# Patient Record
Sex: MALE | Race: WHITE | URBAN - METROPOLITAN AREA
[De-identification: names, ages, dates, MRNs, and addresses within clinical notes are randomized per-mention and may not be internally consistent; named-entity substitution may affect disease eponyms.]

---

## 2017-02-02 ENCOUNTER — OFFICE VISIT (OUTPATIENT)
Dept: URGENT CARE | Age: 42
End: 2017-02-02

## 2017-02-02 VITALS
HEART RATE: 84 BPM | BODY MASS INDEX: 31.83 KG/M2 | OXYGEN SATURATION: 97 % | HEIGHT: 68 IN | WEIGHT: 210 LBS | DIASTOLIC BLOOD PRESSURE: 84 MMHG | SYSTOLIC BLOOD PRESSURE: 136 MMHG | RESPIRATION RATE: 16 BRPM | TEMPERATURE: 98 F

## 2017-02-02 DIAGNOSIS — R10.32 LEFT GROIN PAIN: Primary | ICD-10-CM

## 2017-02-02 PROCEDURE — 99203 OFFICE O/P NEW LOW 30 MIN: CPT | Performed by: EMERGENCY MEDICINE

## 2017-02-02 RX ORDER — HYDROCODONE BITARTRATE AND ACETAMINOPHEN 5; 325 MG/1; MG/1
1 TABLET ORAL EVERY 6 HOURS PRN
Qty: 20 TABLET | Refills: 0 | Status: SHIPPED | OUTPATIENT
Start: 2017-02-02 | End: 2017-02-09

## 2017-02-03 ENCOUNTER — TELEPHONE (OUTPATIENT)
Dept: CARDIOLOGY CLINIC | Age: 42
End: 2017-02-03

## 2017-02-07 ENCOUNTER — OFFICE VISIT (OUTPATIENT)
Dept: CARDIOLOGY CLINIC | Age: 42
End: 2017-02-07

## 2017-02-07 ENCOUNTER — PROCEDURE VISIT (OUTPATIENT)
Dept: CARDIOLOGY CLINIC | Age: 42
End: 2017-02-07

## 2017-02-07 VITALS
HEART RATE: 75 BPM | HEIGHT: 68 IN | BODY MASS INDEX: 32.86 KG/M2 | WEIGHT: 216.8 LBS | DIASTOLIC BLOOD PRESSURE: 86 MMHG | SYSTOLIC BLOOD PRESSURE: 128 MMHG | OXYGEN SATURATION: 96 %

## 2017-02-07 DIAGNOSIS — M10.9 GOUT, UNSPECIFIED CAUSE, UNSPECIFIED CHRONICITY, UNSPECIFIED SITE: ICD-10-CM

## 2017-02-07 DIAGNOSIS — E78.5 HYPERLIPIDEMIA, UNSPECIFIED HYPERLIPIDEMIA TYPE: ICD-10-CM

## 2017-02-07 DIAGNOSIS — I42.9 CARDIOMYOPATHY (HCC): ICD-10-CM

## 2017-02-07 DIAGNOSIS — I10 ESSENTIAL HYPERTENSION: ICD-10-CM

## 2017-02-07 DIAGNOSIS — Z95.1 S/P CABG X 3: ICD-10-CM

## 2017-02-07 DIAGNOSIS — I15.0 RENOVASCULAR HYPERTENSION: Primary | ICD-10-CM

## 2017-02-07 DIAGNOSIS — Z95.810 ICD (IMPLANTABLE CARDIOVERTER-DEFIBRILLATOR) IN PLACE: Primary | ICD-10-CM

## 2017-02-07 DIAGNOSIS — I25.118 CORONARY ARTERY DISEASE OF NATIVE ARTERY OF NATIVE HEART WITH STABLE ANGINA PECTORIS (HCC): ICD-10-CM

## 2017-02-07 DIAGNOSIS — Z95.810 ICD (IMPLANTABLE CARDIOVERTER-DEFIBRILLATOR) IN PLACE: ICD-10-CM

## 2017-02-07 PROCEDURE — 93000 ELECTROCARDIOGRAM COMPLETE: CPT | Performed by: INTERNAL MEDICINE

## 2017-02-07 PROCEDURE — 93280 PM DEVICE PROGR EVAL DUAL: CPT | Performed by: INTERNAL MEDICINE

## 2017-02-07 PROCEDURE — 99204 OFFICE O/P NEW MOD 45 MIN: CPT | Performed by: INTERNAL MEDICINE

## 2017-02-07 RX ORDER — LEVOTHYROXINE SODIUM 0.12 MG/1
125 TABLET ORAL DAILY
Qty: 30 TABLET | Refills: 5 | Status: SHIPPED | OUTPATIENT
Start: 2017-02-07 | End: 2017-09-29 | Stop reason: SDUPTHER

## 2017-02-07 RX ORDER — CARVEDILOL 12.5 MG/1
TABLET ORAL
Qty: 90 TABLET | Refills: 5 | Status: SHIPPED | OUTPATIENT
Start: 2017-02-07 | End: 2017-03-31 | Stop reason: SDUPTHER

## 2017-02-07 RX ORDER — LISINOPRIL 5 MG/1
TABLET ORAL
Qty: 30 TABLET | Refills: 5 | Status: SHIPPED | OUTPATIENT
Start: 2017-02-07 | End: 2017-03-31 | Stop reason: SDUPTHER

## 2017-02-07 RX ORDER — NITROGLYCERIN 0.4 MG/1
0.4 TABLET SUBLINGUAL EVERY 5 MIN PRN
Qty: 25 TABLET | Refills: 5 | Status: SHIPPED | OUTPATIENT
Start: 2017-02-07 | End: 2017-09-08 | Stop reason: SDUPTHER

## 2017-02-28 ENCOUNTER — TELEPHONE (OUTPATIENT)
Dept: CARDIOLOGY CLINIC | Age: 42
End: 2017-02-28

## 2017-03-01 DIAGNOSIS — J45.30 MILD PERSISTENT ASTHMA WITHOUT COMPLICATION: ICD-10-CM

## 2017-03-10 ENCOUNTER — HOSPITAL ENCOUNTER (OUTPATIENT)
Dept: CARDIOLOGY | Facility: CLINIC | Age: 42
Discharge: HOME | End: 2017-03-10
Attending: INTERNAL MEDICINE | Admitting: INTERNAL MEDICINE

## 2017-03-15 ENCOUNTER — TELEPHONE (OUTPATIENT)
Dept: CARDIOLOGY CLINIC | Age: 42
End: 2017-03-15

## 2017-03-31 ENCOUNTER — OFFICE VISIT (OUTPATIENT)
Dept: CARDIOLOGY CLINIC | Age: 42
End: 2017-03-31

## 2017-03-31 VITALS
WEIGHT: 215 LBS | HEIGHT: 68 IN | DIASTOLIC BLOOD PRESSURE: 106 MMHG | BODY MASS INDEX: 32.58 KG/M2 | OXYGEN SATURATION: 98 % | HEART RATE: 76 BPM | SYSTOLIC BLOOD PRESSURE: 174 MMHG

## 2017-03-31 DIAGNOSIS — I15.0 RENOVASCULAR HYPERTENSION: ICD-10-CM

## 2017-03-31 DIAGNOSIS — F32.A ANXIETY AND DEPRESSION: ICD-10-CM

## 2017-03-31 DIAGNOSIS — Z95.1 S/P CABG X 3: ICD-10-CM

## 2017-03-31 DIAGNOSIS — I25.118 CORONARY ARTERY DISEASE OF NATIVE ARTERY OF NATIVE HEART WITH STABLE ANGINA PECTORIS (HCC): Primary | ICD-10-CM

## 2017-03-31 DIAGNOSIS — I10 ESSENTIAL HYPERTENSION: ICD-10-CM

## 2017-03-31 DIAGNOSIS — I42.9 CARDIOMYOPATHY (HCC): ICD-10-CM

## 2017-03-31 DIAGNOSIS — F41.9 ANXIETY AND DEPRESSION: ICD-10-CM

## 2017-03-31 DIAGNOSIS — E78.2 MIXED HYPERLIPIDEMIA: ICD-10-CM

## 2017-03-31 PROCEDURE — 99214 OFFICE O/P EST MOD 30 MIN: CPT | Performed by: INTERNAL MEDICINE

## 2017-03-31 RX ORDER — LISINOPRIL 10 MG/1
10 TABLET ORAL DAILY
Qty: 30 TABLET | Refills: 5 | Status: SHIPPED | OUTPATIENT
Start: 2017-03-31 | End: 2017-10-24 | Stop reason: SDUPTHER

## 2017-03-31 RX ORDER — CARVEDILOL 25 MG/1
25 TABLET ORAL 2 TIMES DAILY
Qty: 60 TABLET | Refills: 5 | Status: SHIPPED | OUTPATIENT
Start: 2017-03-31 | End: 2017-10-24 | Stop reason: SDUPTHER

## 2017-03-31 RX ORDER — BUSPIRONE HYDROCHLORIDE 10 MG/1
10 TABLET ORAL 3 TIMES DAILY
Qty: 90 TABLET | Refills: 5 | Status: SHIPPED | OUTPATIENT
Start: 2017-03-31 | End: 2018-06-12

## 2017-04-27 ENCOUNTER — TELEPHONE (OUTPATIENT)
Dept: CARDIOLOGY CLINIC | Age: 42
End: 2017-04-27

## 2017-04-27 DIAGNOSIS — J45.30 MILD PERSISTENT ASTHMA WITHOUT COMPLICATION: ICD-10-CM

## 2017-04-28 DIAGNOSIS — J45.30 MILD PERSISTENT ASTHMA WITHOUT COMPLICATION: ICD-10-CM

## 2017-04-28 RX ORDER — ALBUTEROL SULFATE 90 UG/1
2 AEROSOL, METERED RESPIRATORY (INHALATION) EVERY 6 HOURS PRN
Qty: 18 G | Refills: 5 | Status: SHIPPED | OUTPATIENT
Start: 2017-04-28 | End: 2017-05-29

## 2017-05-09 ENCOUNTER — NURSE ONLY (OUTPATIENT)
Dept: CARDIOLOGY CLINIC | Age: 42
End: 2017-05-09

## 2017-05-09 DIAGNOSIS — I42.9 CARDIOMYOPATHY (HCC): ICD-10-CM

## 2017-05-09 DIAGNOSIS — Z95.810 ICD (IMPLANTABLE CARDIOVERTER-DEFIBRILLATOR) IN PLACE: Primary | ICD-10-CM

## 2017-05-09 PROCEDURE — 93295 DEV INTERROG REMOTE 1/2/MLT: CPT | Performed by: INTERNAL MEDICINE

## 2017-05-09 PROCEDURE — 93296 REM INTERROG EVL PM/IDS: CPT | Performed by: INTERNAL MEDICINE

## 2017-05-30 ENCOUNTER — OFFICE VISIT (OUTPATIENT)
Dept: ORTHOPEDIC SURGERY | Age: 42
End: 2017-05-30

## 2017-05-30 VITALS — HEIGHT: 68 IN | BODY MASS INDEX: 32.14 KG/M2 | WEIGHT: 212.08 LBS

## 2017-05-30 DIAGNOSIS — M25.852 HIP IMPINGEMENT SYNDROME, LEFT: ICD-10-CM

## 2017-05-30 DIAGNOSIS — M70.62 GREATER TROCHANTERIC BURSITIS, LEFT: Primary | ICD-10-CM

## 2017-05-30 PROBLEM — M70.60 GREATER TROCHANTERIC BURSITIS: Status: ACTIVE | Noted: 2017-05-30

## 2017-05-30 PROCEDURE — 20611 DRAIN/INJ JOINT/BURSA W/US: CPT | Performed by: PHYSICIAN ASSISTANT

## 2017-05-30 PROCEDURE — 99203 OFFICE O/P NEW LOW 30 MIN: CPT | Performed by: PHYSICIAN ASSISTANT

## 2017-08-29 ENCOUNTER — NURSE ONLY (OUTPATIENT)
Dept: CARDIOLOGY CLINIC | Age: 42
End: 2017-08-29

## 2017-08-29 DIAGNOSIS — I42.9 CARDIOMYOPATHY, UNSPECIFIED TYPE (HCC): ICD-10-CM

## 2017-08-29 DIAGNOSIS — Z95.810 ICD (IMPLANTABLE CARDIOVERTER-DEFIBRILLATOR) IN PLACE: Primary | ICD-10-CM

## 2017-08-29 PROCEDURE — 93296 REM INTERROG EVL PM/IDS: CPT | Performed by: INTERNAL MEDICINE

## 2017-08-29 PROCEDURE — 93295 DEV INTERROG REMOTE 1/2/MLT: CPT | Performed by: INTERNAL MEDICINE

## 2017-09-08 RX ORDER — NITROGLYCERIN 0.4 MG/1
0.4 TABLET SUBLINGUAL EVERY 5 MIN PRN
Qty: 25 TABLET | Refills: 3 | Status: SHIPPED | OUTPATIENT
Start: 2017-09-08 | End: 2017-10-24 | Stop reason: SDUPTHER

## 2017-09-29 RX ORDER — LEVOTHYROXINE SODIUM 0.12 MG/1
TABLET ORAL
Qty: 30 TABLET | Refills: 0 | Status: SHIPPED | OUTPATIENT
Start: 2017-09-29 | End: 2017-10-24 | Stop reason: SDUPTHER

## 2017-10-24 ENCOUNTER — OFFICE VISIT (OUTPATIENT)
Dept: CARDIOLOGY CLINIC | Age: 42
End: 2017-10-24

## 2017-10-24 VITALS
HEART RATE: 82 BPM | SYSTOLIC BLOOD PRESSURE: 136 MMHG | OXYGEN SATURATION: 96 % | WEIGHT: 224 LBS | DIASTOLIC BLOOD PRESSURE: 86 MMHG | HEIGHT: 68 IN | BODY MASS INDEX: 33.95 KG/M2

## 2017-10-24 DIAGNOSIS — I25.118 CORONARY ARTERY DISEASE OF NATIVE ARTERY OF NATIVE HEART WITH STABLE ANGINA PECTORIS (HCC): ICD-10-CM

## 2017-10-24 DIAGNOSIS — Z95.810 ICD (IMPLANTABLE CARDIOVERTER-DEFIBRILLATOR) IN PLACE: ICD-10-CM

## 2017-10-24 DIAGNOSIS — E78.2 MIXED HYPERLIPIDEMIA: ICD-10-CM

## 2017-10-24 DIAGNOSIS — I10 ESSENTIAL HYPERTENSION: Primary | ICD-10-CM

## 2017-10-24 DIAGNOSIS — I25.5 ISCHEMIC CARDIOMYOPATHY: ICD-10-CM

## 2017-10-24 PROCEDURE — 99214 OFFICE O/P EST MOD 30 MIN: CPT | Performed by: INTERNAL MEDICINE

## 2017-10-24 RX ORDER — ISOSORBIDE DINITRATE 10 MG/1
10 TABLET ORAL 2 TIMES DAILY
Qty: 60 TABLET | Refills: 6 | Status: SHIPPED | OUTPATIENT
Start: 2017-10-24 | End: 2018-06-12 | Stop reason: SDUPTHER

## 2017-10-24 RX ORDER — LEVOTHYROXINE SODIUM 0.12 MG/1
125 TABLET ORAL DAILY
Qty: 30 TABLET | Refills: 11 | Status: SHIPPED | OUTPATIENT
Start: 2017-10-24 | End: 2018-11-19 | Stop reason: SDUPTHER

## 2017-10-24 RX ORDER — NITROGLYCERIN 0.4 MG/1
0.4 TABLET SUBLINGUAL EVERY 5 MIN PRN
Qty: 25 TABLET | Refills: 3 | Status: SHIPPED | OUTPATIENT
Start: 2017-10-24 | End: 2018-04-27 | Stop reason: SDUPTHER

## 2017-10-24 RX ORDER — LISINOPRIL 10 MG/1
10 TABLET ORAL DAILY
Qty: 30 TABLET | Refills: 11 | Status: SHIPPED | OUTPATIENT
Start: 2017-10-24 | End: 2018-06-12 | Stop reason: SDUPTHER

## 2017-10-24 RX ORDER — CARVEDILOL 25 MG/1
25 TABLET ORAL 2 TIMES DAILY
Qty: 60 TABLET | Refills: 11 | Status: SHIPPED | OUTPATIENT
Start: 2017-10-24 | End: 2018-06-12 | Stop reason: SDUPTHER

## 2017-10-24 NOTE — PROGRESS NOTES
 Sexual dysfunction      Past Surgical History:   Procedure Laterality Date    CARDIAC DEFIBRILLATOR PLACEMENT         Objective:   /86   Pulse 82   Ht 5' 8\" (1.727 m)   Wt 224 lb (101.6 kg)   SpO2 96%   BMI 34.06 kg/m²     Wt Readings from Last 3 Encounters:   10/24/17 224 lb (101.6 kg)   05/30/17 212 lb 1.3 oz (96.2 kg)   05/29/17 212 lb (96.2 kg)       Physical Exam:  General: No Respiratory distress, appears well developed and well nourished. Eyes:  Sclera nonicteric  Nose/Sinuses:  negative findings: nose shows no deformity, asymmetry, or inflammation, nasal mucosa normal, septum midline with no perforation or bleeding  Back:  no pain to palpation  Joint:  no active joint inflammation  Musculoskeletal:  negative  Skin:  Warm and dry  Neck:  Negative for JVD and Carotid Bruits. Chest:  Clear to auscultation, respiration easy  Cardiovascular:  RRR, S1S2 normal, no murmur, no rub or thrill. Abdomen:  Soft normal liver and spleen  Extremities:   No edema, clubbing, cyanosis,  Pulses: Femoral and pedal pulses are normal.  Neuro: intact    Medications:   Outpatient Encounter Prescriptions as of 10/24/2017   Medication Sig Dispense Refill    levothyroxine (SYNTHROID) 125 MCG tablet TAKE ONE TABLET BY MOUTH DAILY 30 tablet 0    nitroGLYCERIN (NITROSTAT) 0.4 MG SL tablet Place 1 tablet under the tongue every 5 minutes as needed for Chest pain 25 tablet 3    albuterol sulfate HFA (PROVENTIL HFA) 108 (90 BASE) MCG/ACT inhaler Inhale 2 puffs into the lungs every 6 hours as needed for Wheezing or Shortness of Breath With spacer (and mask if indicated). Thanks.  1 Inhaler 0    aspirin 81 MG tablet Take 81 mg by mouth daily      lisinopril (PRINIVIL;ZESTRIL) 10 MG tablet Take 1 tablet by mouth daily One daily 30 tablet 5    carvedilol (COREG) 25 MG tablet Take 1 tablet by mouth 2 times daily 60 tablet 5    busPIRone (BUSPAR) 10 MG tablet Take 1 tablet by mouth 3 times daily 90 tablet 5    ALPRAZolam Kandis Duty) 0.5 MG tablet Take 1 tablet by mouth nightly as needed for Sleep or Anxiety 30 tablet 0    [DISCONTINUED] budesonide-formoterol (SYMBICORT) 160-4.5 MCG/ACT AERO Inhale 2 puffs into the lungs 2 times daily. 1 Inhaler 5    colchicine 0.6 MG tablet Take 1 tablet by mouth daily. 30 tablet 5     No facility-administered encounter medications on file as of 10/24/2017. Lab Data:  CBC: No results for input(s): WBC, HGB, HCT, MCV, PLT in the last 72 hours. BMP: No results for input(s): NA, K, CL, CO2, PHOS, BUN, CREATININE in the last 72 hours. Invalid input(s): CA  LIVER PROFILE: No results for input(s): AST, ALT, LIPASE, BILIDIR, BILITOT, ALKPHOS in the last 72 hours. Invalid input(s): AMYLASE,  ALB  LIPID: No results found for: CHOL  No results found for: TRIG  No results found for: HDL  No results found for: LDLCHOLESTEROL, LDLCALC  No results found for: LABVLDL, VLDL  No results found for: CHOLHDLRATIO  PT/INR: No results for input(s): PROTIME, INR in the last 72 hours. A1C: No results found for: LABA1C  BNP:  No results for input(s): BNP in the last 72 hours. IMAGING:   Most recent device check 8/29/17  Carelink transmission shows normal sensing and pacing function. Echo 10/15/15 [facility]  Summary   Limited echo for LV function with limited doppler/color. No echo for   comparison. The left ventricular systolic function is moderately reduced with an   ejection fraction of 35-40%. There is severe hypokinesis of the apical   septal, basal inferoseptal, basal inferior and the entire inferolateral   walls. The apex is akinetic. LV size is upper limits of normal. Grade 1   diastolic dysfunction with normal filling pressures. Assessment:  Lilian Street was seen today for 3 month follow-up, coronary artery disease, hyperlipidemia, hypertension, results, discuss labs, chest pain, shortness of breath and fatigue.     Diagnoses and all orders for this visit:    Essential hypertension    Mixed

## 2017-10-24 NOTE — COMMUNICATION BODY
ALPRAZolam (XANAX) 0.5 MG tablet Take 1 tablet by mouth nightly as needed for Sleep or Anxiety 30 tablet 0    [DISCONTINUED] budesonide-formoterol (SYMBICORT) 160-4.5 MCG/ACT AERO Inhale 2 puffs into the lungs 2 times daily. 1 Inhaler 5    colchicine 0.6 MG tablet Take 1 tablet by mouth daily. 30 tablet 5     No facility-administered encounter medications on file as of 10/24/2017. Lab Data:  CBC: No results for input(s): WBC, HGB, HCT, MCV, PLT in the last 72 hours. BMP: No results for input(s): NA, K, CL, CO2, PHOS, BUN, CREATININE in the last 72 hours. Invalid input(s): CA  LIVER PROFILE: No results for input(s): AST, ALT, LIPASE, BILIDIR, BILITOT, ALKPHOS in the last 72 hours. Invalid input(s): AMYLASE,  ALB  LIPID: No results found for: CHOL  No results found for: TRIG  No results found for: HDL  No results found for: LDLCHOLESTEROL, LDLCALC  No results found for: LABVLDL, VLDL  No results found for: CHOLHDLRATIO  PT/INR: No results for input(s): PROTIME, INR in the last 72 hours. A1C: No results found for: LABA1C  BNP:  No results for input(s): BNP in the last 72 hours. IMAGING:   Most recent device check 8/29/17  Carelink transmission shows normal sensing and pacing function. Echo 10/15/15 [facility]  Summary   Limited echo for LV function with limited doppler/color. No echo for   comparison. The left ventricular systolic function is moderately reduced with an   ejection fraction of 35-40%. There is severe hypokinesis of the apical   septal, basal inferoseptal, basal inferior and the entire inferolateral   walls. The apex is akinetic. LV size is upper limits of normal. Grade 1   diastolic dysfunction with normal filling pressures. Assessment:  Tala Isaac was seen today for 3 month follow-up, coronary artery disease, hyperlipidemia, hypertension, results, discuss labs, chest pain, shortness of breath and fatigue.     Diagnoses and all orders for this visit:    Essential hypertension    Mixed hyperlipidemia    Coronary artery disease of native artery of native heart with stable angina pectoris ([facility] [address]    ICD (implantable cardioverter-defibrillator) 11/11/16    Ischemic cardiomyopathy        Plan:  1.  OK to take Imdur but will change to Isosorbide 10 mg 1 daily may take up to twice a day OK to take SL nitro if needed as well  2. Unable to tolerate statin  3. Remain as active as possible. 4. Check blood pressure daily. Goal <140/90.   5 Follow up with me in 6 months. QUALITY MEASURES  1. Tobacco Cessation Counseling: NA  2. Retake of BP if >140/90:   NA  3. Documentation to PCP/referring for new patient:  Sent to PCP at close of office visit  4. CAD patient on anti-platelet: Yes  5. CAD patient on STATIN therapy:  No unable to tolerate statin  6.  Patient with CHF and aFib on anticoagulation:  NA     [address] [place], MD 10/24/2017 3:49 PM

## 2017-12-05 ENCOUNTER — NURSE ONLY (OUTPATIENT)
Dept: CARDIOLOGY CLINIC | Age: 42
End: 2017-12-05

## 2017-12-05 DIAGNOSIS — I25.5 ISCHEMIC CARDIOMYOPATHY: ICD-10-CM

## 2017-12-05 DIAGNOSIS — Z95.810 ICD (IMPLANTABLE CARDIOVERTER-DEFIBRILLATOR) IN PLACE: Primary | ICD-10-CM

## 2017-12-05 PROCEDURE — 93295 DEV INTERROG REMOTE 1/2/MLT: CPT | Performed by: INTERNAL MEDICINE

## 2017-12-05 PROCEDURE — 93296 REM INTERROG EVL PM/IDS: CPT | Performed by: INTERNAL MEDICINE

## 2017-12-05 NOTE — LETTER
[address] [facility] [phone]  [facility]    Pacemaker/Defibrillator Clinic          12/11/17        [address] [place] [address]        Dear Josiah Mccrackne    This letter is to inform you that we received the transmission from your monitor at home that checks your pacemaker and/or defibrillator, or implanted heart monitor. Everything is within normal limits. The next date your monitor will automatically transmit will be 3/20/18 Please do not send additional routine transmissions unless specifically requested. Your device and monitor are wireless and most transmit cellularly, but please periodically check your monitor is still plugged in to the electrical outlet. If you still use the telephone land line to send please ensure the connection to the phone lamar is secure. This will help to ensure successful automatic transmissions in the future. Also, the monitor needs to be close to you while sleeping at night. Please be aware that the remote device transmission sites are periodically monitored only during regular business hours during which simultaneous in-office device clinics are being run. If your transmission requires attention, we will contact you as soon as possible. Thank you.             Diane 81

## 2018-03-20 ENCOUNTER — NURSE ONLY (OUTPATIENT)
Dept: CARDIOLOGY CLINIC | Age: 43
End: 2018-03-20

## 2018-03-20 DIAGNOSIS — Z95.810 ICD (IMPLANTABLE CARDIOVERTER-DEFIBRILLATOR) IN PLACE: Primary | ICD-10-CM

## 2018-03-20 DIAGNOSIS — I25.5 ISCHEMIC CARDIOMYOPATHY: ICD-10-CM

## 2018-03-20 PROCEDURE — 93295 DEV INTERROG REMOTE 1/2/MLT: CPT | Performed by: INTERNAL MEDICINE

## 2018-03-20 PROCEDURE — 93296 REM INTERROG EVL PM/IDS: CPT | Performed by: INTERNAL MEDICINE

## 2018-04-27 RX ORDER — NITROGLYCERIN 0.4 MG/1
0.4 TABLET SUBLINGUAL EVERY 5 MIN PRN
Qty: 25 TABLET | Refills: 3 | Status: SHIPPED | OUTPATIENT
Start: 2018-04-27 | End: 2018-10-01 | Stop reason: SDUPTHER

## 2018-06-05 ENCOUNTER — PROCEDURE VISIT (OUTPATIENT)
Dept: CARDIOLOGY CLINIC | Age: 43
End: 2018-06-05

## 2018-06-05 DIAGNOSIS — I25.5 ISCHEMIC CARDIOMYOPATHY: ICD-10-CM

## 2018-06-05 DIAGNOSIS — Z95.810 ICD (IMPLANTABLE CARDIOVERTER-DEFIBRILLATOR) IN PLACE: ICD-10-CM

## 2018-06-12 ENCOUNTER — OFFICE VISIT (OUTPATIENT)
Dept: CARDIOLOGY CLINIC | Age: 43
End: 2018-06-12

## 2018-06-12 VITALS
DIASTOLIC BLOOD PRESSURE: 82 MMHG | HEART RATE: 86 BPM | HEIGHT: 68 IN | BODY MASS INDEX: 33.65 KG/M2 | SYSTOLIC BLOOD PRESSURE: 134 MMHG | WEIGHT: 222 LBS | OXYGEN SATURATION: 97 %

## 2018-06-12 DIAGNOSIS — Z95.810 ICD (IMPLANTABLE CARDIOVERTER-DEFIBRILLATOR) IN PLACE: ICD-10-CM

## 2018-06-12 DIAGNOSIS — I10 ESSENTIAL HYPERTENSION: ICD-10-CM

## 2018-06-12 DIAGNOSIS — I25.5 ISCHEMIC CARDIOMYOPATHY: ICD-10-CM

## 2018-06-12 DIAGNOSIS — I25.118 CORONARY ARTERY DISEASE OF NATIVE ARTERY OF NATIVE HEART WITH STABLE ANGINA PECTORIS (HCC): Primary | ICD-10-CM

## 2018-06-12 PROCEDURE — 99214 OFFICE O/P EST MOD 30 MIN: CPT | Performed by: INTERNAL MEDICINE

## 2018-06-12 PROCEDURE — 93283 PRGRMG EVAL IMPLANTABLE DFB: CPT | Performed by: INTERNAL MEDICINE

## 2018-06-12 RX ORDER — ISOSORBIDE DINITRATE 10 MG/1
10 TABLET ORAL 2 TIMES DAILY
Qty: 60 TABLET | Refills: 11 | Status: SHIPPED | OUTPATIENT
Start: 2018-06-12 | End: 2019-06-24 | Stop reason: SDUPTHER

## 2018-06-12 RX ORDER — LISINOPRIL 10 MG/1
10 TABLET ORAL DAILY
Qty: 30 TABLET | Refills: 11 | Status: SHIPPED | OUTPATIENT
Start: 2018-06-12 | End: 2019-06-24 | Stop reason: SDUPTHER

## 2018-06-12 RX ORDER — CARVEDILOL 25 MG/1
25 TABLET ORAL 2 TIMES DAILY
Qty: 60 TABLET | Refills: 11 | Status: SHIPPED | OUTPATIENT
Start: 2018-06-12 | End: 2019-06-24 | Stop reason: SDUPTHER

## 2018-06-12 RX ORDER — ALBUTEROL SULFATE 90 UG/1
2 AEROSOL, METERED RESPIRATORY (INHALATION) EVERY 6 HOURS PRN
Qty: 1 INHALER | Refills: 3 | Status: SHIPPED | OUTPATIENT
Start: 2018-06-12 | End: 2020-01-21

## 2018-09-11 ENCOUNTER — NURSE ONLY (OUTPATIENT)
Dept: CARDIOLOGY CLINIC | Age: 43
End: 2018-09-11

## 2018-09-11 DIAGNOSIS — Z95.810 ICD (IMPLANTABLE CARDIOVERTER-DEFIBRILLATOR) IN PLACE: Primary | ICD-10-CM

## 2018-09-11 DIAGNOSIS — I25.5 ISCHEMIC CARDIOMYOPATHY: ICD-10-CM

## 2018-09-11 PROCEDURE — 93295 DEV INTERROG REMOTE 1/2/MLT: CPT | Performed by: INTERNAL MEDICINE

## 2018-09-11 PROCEDURE — 93296 REM INTERROG EVL PM/IDS: CPT | Performed by: INTERNAL MEDICINE

## 2018-09-13 NOTE — PROGRESS NOTES
Carelink transmission shows normal functioning device. Thoracic impedance stable. See PACEART report under Cardiology tab.

## 2018-10-01 RX ORDER — NITROGLYCERIN 0.4 MG/1
0.4 TABLET SUBLINGUAL EVERY 5 MIN PRN
Qty: 25 TABLET | Refills: 3 | Status: SHIPPED | OUTPATIENT
Start: 2018-10-01 | End: 2019-03-08 | Stop reason: SDUPTHER

## 2018-11-19 RX ORDER — LEVOTHYROXINE SODIUM 0.12 MG/1
125 TABLET ORAL DAILY
Qty: 30 TABLET | Refills: 5 | Status: SHIPPED | OUTPATIENT
Start: 2018-11-19 | End: 2019-10-24 | Stop reason: SDUPTHER

## 2018-12-11 ENCOUNTER — NURSE ONLY (OUTPATIENT)
Dept: CARDIOLOGY CLINIC | Age: 43
End: 2018-12-11
Payer: COMMERCIAL

## 2018-12-11 DIAGNOSIS — Z95.810 ICD (IMPLANTABLE CARDIOVERTER-DEFIBRILLATOR) IN PLACE: ICD-10-CM

## 2018-12-11 DIAGNOSIS — I25.5 ISCHEMIC CARDIOMYOPATHY: ICD-10-CM

## 2018-12-11 PROCEDURE — 93295 DEV INTERROG REMOTE 1/2/MLT: CPT | Performed by: INTERNAL MEDICINE

## 2018-12-11 PROCEDURE — 93296 REM INTERROG EVL PM/IDS: CPT | Performed by: INTERNAL MEDICINE

## 2018-12-11 NOTE — LETTER
[address] [facility] [phone]  [place]- [phone]  gen [phone]    Pacemaker/Defibrillator Clinic          12/12/18        [address] [place] [id number]        Dear Sinai Brumfield    This letter is to inform you that we received the transmission from your monitor at home that checks your pacemaker and/or defibrillator, or implanted heart monitor. The next date your monitor will automatically transmit will be 03/12/19. Please do not send additional routine transmissions unless specifically requested. Your device and monitor are wireless and most transmit cellularly, but please periodically check your monitor is still plugged in to the electrical outlet. If you still use the telephone land line to send please ensure the connection to the phone lamar is secure. This will help to ensure successful automatic transmissions in the future. Also, the monitor needs to be close to you while sleeping at night. Please be aware that the remote device transmission sites are periodically monitored only during regular business hours during which simultaneous in-office device clinics are being run. If your transmission requires attention, we will contact you as soon as possible. Thank you.             Diane 81

## 2019-03-08 RX ORDER — NITROGLYCERIN 0.4 MG/1
0.4 TABLET SUBLINGUAL EVERY 5 MIN PRN
Qty: 25 TABLET | Refills: 0 | Status: SHIPPED | OUTPATIENT
Start: 2019-03-08 | End: 2019-06-04 | Stop reason: SDUPTHER

## 2019-03-12 ENCOUNTER — NURSE ONLY (OUTPATIENT)
Dept: CARDIOLOGY CLINIC | Age: 44
End: 2019-03-12
Payer: COMMERCIAL

## 2019-03-12 DIAGNOSIS — Z95.810 ICD (IMPLANTABLE CARDIOVERTER-DEFIBRILLATOR) IN PLACE: ICD-10-CM

## 2019-03-12 DIAGNOSIS — I25.5 ISCHEMIC CARDIOMYOPATHY: ICD-10-CM

## 2019-03-12 PROCEDURE — 93296 REM INTERROG EVL PM/IDS: CPT | Performed by: INTERNAL MEDICINE

## 2019-03-12 PROCEDURE — 93295 DEV INTERROG REMOTE 1/2/MLT: CPT | Performed by: INTERNAL MEDICINE

## 2019-06-04 NOTE — TELEPHONE ENCOUNTER
Pt called and said that the pharmacy will not refill his Nitro w/out an office visit. Pt is scheduled for 7/9 w/MELANY @ [facility]. Pt needed a Tuesday afternoon appt. Pt is requesting if we can refill his Nitro before the appt? Please advise, thank you.

## 2019-06-06 RX ORDER — NITROGLYCERIN 0.4 MG/1
TABLET SUBLINGUAL
Qty: 10 TABLET | Refills: 0 | Status: SHIPPED | OUTPATIENT
Start: 2019-06-06 | End: 2019-07-09 | Stop reason: SDUPTHER

## 2019-06-11 ENCOUNTER — NURSE ONLY (OUTPATIENT)
Dept: CARDIOLOGY CLINIC | Age: 44
End: 2019-06-11
Payer: COMMERCIAL

## 2019-06-11 DIAGNOSIS — Z95.810 ICD (IMPLANTABLE CARDIOVERTER-DEFIBRILLATOR) IN PLACE: ICD-10-CM

## 2019-06-11 DIAGNOSIS — I25.5 ISCHEMIC CARDIOMYOPATHY: ICD-10-CM

## 2019-06-11 PROCEDURE — 93295 DEV INTERROG REMOTE 1/2/MLT: CPT | Performed by: INTERNAL MEDICINE

## 2019-06-11 PROCEDURE — 93296 REM INTERROG EVL PM/IDS: CPT | Performed by: INTERNAL MEDICINE

## 2019-06-11 NOTE — LETTER
[address] [facility] [phone]  [facility]    Pacemaker/Defibrillator Clinic          06/11/19        [address] [place] [id number]        Dear [place]    This letter is to inform you that we received the transmission from your monitor at home that checks your pacemaker and/or defibrillator, or implanted heart monitor. The next date your monitor will automatically transmit will be 9/10/19. Your device and monitor are wireless and most transmit cellularly, but please periodically check your monitor is still plugged in to the electrical outlet. If you still use the telephone land line to send please ensure the connection to the phone lamar is secure. This will help to ensure successful automatic transmissions in the future. Also, the monitor needs to be close to you while sleeping at night. Please be aware that the remote device transmission sites are periodically monitored only during regular business hours during which simultaneous in-office device clinics are being run. If your transmission requires attention, we will contact you as soon as possible. Thank you.             [address]

## 2019-06-11 NOTE — PROGRESS NOTES
Carelink transmission shows normal sensing and pacing function. See interrogation for more details. Thoracic impedance trend stable. No new arrhythmias. AP 9.6%   <0.1%  Follow up in 3 months via carelink. See PACEART report under Cardiology tab.

## 2019-06-24 DIAGNOSIS — I10 ESSENTIAL HYPERTENSION: ICD-10-CM

## 2019-06-24 RX ORDER — ISOSORBIDE DINITRATE 10 MG/1
10 TABLET ORAL 2 TIMES DAILY
Qty: 60 TABLET | Refills: 0 | Status: SHIPPED | OUTPATIENT
Start: 2019-06-24 | End: 2019-07-09 | Stop reason: SDUPTHER

## 2019-06-24 RX ORDER — CARVEDILOL 25 MG/1
25 TABLET ORAL 2 TIMES DAILY
Qty: 60 TABLET | Refills: 0 | Status: SHIPPED | OUTPATIENT
Start: 2019-06-24 | End: 2019-07-09 | Stop reason: SDUPTHER

## 2019-06-24 RX ORDER — LISINOPRIL 10 MG/1
10 TABLET ORAL DAILY
Qty: 30 TABLET | Refills: 0 | Status: SHIPPED | OUTPATIENT
Start: 2019-06-24 | End: 2019-07-09 | Stop reason: SDUPTHER

## 2019-07-08 PROBLEM — I25.10 CORONARY ARTERY DISEASE INVOLVING NATIVE CORONARY ARTERY OF NATIVE HEART WITHOUT ANGINA PECTORIS: Status: ACTIVE | Noted: 2017-02-07

## 2019-07-08 NOTE — PROGRESS NOTES
[facility] Office Note  7/9/2019     Subjective:  Mr. Shaq Pereyra presents for follow up for CAD with CABG and dual chamber ICD management. HPI:   Today he reports he has been doing good, except for last month when he was under a lot of stress with house being repossessed and world crumbling down around him. He had daily episodes of chest pain with associated SOB. Now things have turned around and he reports  chest pains only few days per week, resolves with SL nitro, and has to lay down. He feels really good otherwise. Denies chest pain, shortness of breath, edema, dizziness, palpitations and syncope. PMH:  Cathie Marley is a 40 y.o. male who presents for evaluation of CAD with CABG in 2007. He had cardiac arrest on 10/13/16 due to arrhythmia while at work. He underwent repeat cardiac cath on 10/13/16 which showed patent LIMA to diagonal,two occluded SVG, extensive collateral formation from LAD system to RCA. Moderate global hypokinesis with lateral wall akinesis. His EF was 30-35%. He underwent dual chamber ICD placement at [facility] on 11/11/16. Review of Systems:  12 point ROS negative in all areas as listed below except as in [place]  Constitutional, EENT, Cardiovascular, pulmonary, GI, , Musculoskeletal, skin, neurological, hematological, endocrine, Psychiatric    Reviewed past medical history, social, and family history. Smoker:nonsmoker  Alcohol:no alcohol  Mother No heart disease  Father heart attacks. CVA  Past Medical History:   Diagnosis Date    Anxious mood     Fatigue     Gouty arthritis     Hyperlipidemia     Hypertension     Hyperthyroidism     Hypothyroidism     Other (abnormal) findings on radiological examination of breast     difibulator placment end of Oct 2016, Dr. Yung Lees Sexual dysfunction      Past Surgical History:   Procedure Laterality Date    CARDIAC DEFIBRILLATOR PLACEMENT         Objective:   /86   Pulse 76   Ht 5' 8\" (1.727 m)   Wt 211 lb (95.7 with an   ejection fraction of 35-40%. There is severe hypokinesis of the apical   septal, basal inferoseptal, basal inferior and the entire inferolateral   walls. The apex is akinetic. LV size is upper limits of normal. Grade 1   diastolic dysfunction with normal filling pressures.     CATH 10/13/16  Cath EF Estimated 30 %   Result Narrative   Patent LIMA to Diagonal.   Two occluded SVG. Extensive collateral formation from LAD system to RCA. Moderate global hypokinesis with lateral wall akinesis. Echo 10/15/15 [facility]  Summary   Limited echo for LV function with limited doppler/color. No echo for   comparison. The left ventricular systolic function is moderately reduced with an   ejection fraction of 35-40%. There is severe hypokinesis of the apical   septal, basal inferoseptal, basal inferior and the entire inferolateral   walls. The apex is akinetic. LV size is upper limits of normal. Grade 1   diastolic dysfunction with normal filling pressures. Assessment:  Jason was seen today for 1 year follow up, coronary artery disease, hyperlipidemia, hypertension, discuss labs, chest pain, shortness of breath and edema. Diagnoses and all orders for this visit:    Coronary artery disease involving native coronary artery of native heart with unstable angina pectoris ([address]  -     Stress test ([facility]); Future    Ischemic cardiomyopathy  -     Stress test ([facility]); Future    ICD (implantable cardioverter-defibrillator) 11/11/16    Essential hypertension  -     carvedilol (COREG) 25 MG tablet; Take 1 tablet by mouth 2 times daily    Mixed hyperlipidemia    Other orders  -     isosorbide dinitrate (ISORDIL) 10 MG tablet; Take 1 tablet by mouth 2 times daily  -     lisinopril (PRINIVIL;ZESTRIL) 10 MG tablet; Take 1 tablet by mouth daily One daily  -     nitroGLYCERIN (NITROSTAT) 0.4 MG SL tablet; Place 1 tablet under the tongue every 5 minutes as needed for Chest pain up to max of 3 total doses.  If

## 2019-07-09 ENCOUNTER — OFFICE VISIT (OUTPATIENT)
Dept: CARDIOLOGY CLINIC | Age: 44
End: 2019-07-09
Payer: COMMERCIAL

## 2019-07-09 VITALS
WEIGHT: 211 LBS | BODY MASS INDEX: 31.98 KG/M2 | DIASTOLIC BLOOD PRESSURE: 86 MMHG | SYSTOLIC BLOOD PRESSURE: 124 MMHG | HEIGHT: 68 IN | OXYGEN SATURATION: 99 % | HEART RATE: 76 BPM

## 2019-07-09 DIAGNOSIS — I10 ESSENTIAL HYPERTENSION: ICD-10-CM

## 2019-07-09 DIAGNOSIS — I25.5 ISCHEMIC CARDIOMYOPATHY: ICD-10-CM

## 2019-07-09 DIAGNOSIS — I25.110 CORONARY ARTERY DISEASE INVOLVING NATIVE CORONARY ARTERY OF NATIVE HEART WITH UNSTABLE ANGINA PECTORIS (HCC): Primary | ICD-10-CM

## 2019-07-09 DIAGNOSIS — Z95.810 ICD (IMPLANTABLE CARDIOVERTER-DEFIBRILLATOR) IN PLACE: ICD-10-CM

## 2019-07-09 DIAGNOSIS — E78.2 MIXED HYPERLIPIDEMIA: ICD-10-CM

## 2019-07-09 PROCEDURE — 99214 OFFICE O/P EST MOD 30 MIN: CPT | Performed by: INTERNAL MEDICINE

## 2019-07-09 RX ORDER — LISINOPRIL 10 MG/1
10 TABLET ORAL DAILY
Qty: 30 TABLET | Refills: 11 | Status: SHIPPED | OUTPATIENT
Start: 2019-07-09 | End: 2020-07-30

## 2019-07-09 RX ORDER — CARVEDILOL 25 MG/1
25 TABLET ORAL 2 TIMES DAILY
Qty: 60 TABLET | Refills: 11 | Status: SHIPPED | OUTPATIENT
Start: 2019-07-09 | End: 2020-07-30

## 2019-07-09 RX ORDER — NITROGLYCERIN 0.4 MG/1
0.4 TABLET SUBLINGUAL EVERY 5 MIN PRN
Qty: 25 TABLET | Refills: 5 | Status: SHIPPED | OUTPATIENT
Start: 2019-07-09 | End: 2020-01-21 | Stop reason: SDUPTHER

## 2019-07-09 RX ORDER — ISOSORBIDE DINITRATE 10 MG/1
10 TABLET ORAL 2 TIMES DAILY
Qty: 60 TABLET | Refills: 11 | Status: SHIPPED | OUTPATIENT
Start: 2019-07-09 | End: 2020-01-21 | Stop reason: ALTCHOICE

## 2019-07-09 NOTE — PATIENT INSTRUCTIONS
kg)   SpO2 99%   BMI 32.08 kg/m²      Wt Readings from Last 3 Encounters:   07/09/19 211 lb (95.7 kg)   06/12/18 222 lb (100.7 kg)   10/24/17 224 lb (101.6 kg)       Physical Exam:  General: No Respiratory distress, appears well developed and well nourished. Eyes:  Sclera nonicteric  Nose/Sinuses:  negative findings: nose shows no deformity, asymmetry, or inflammation, nasal mucosa normal, septum midline with no perforation or bleeding  Back:  no pain to palpation  Joint:  no active joint inflammation  Musculoskeletal:  negative  Skin:  Warm and dry  Neck:  Negative for JVD and Carotid Bruits. Chest:  Clear to auscultation, respiration easy  Cardiovascular:  RRR, S1S2 normal, no murmur, no rub or thrill. Abdomen:  Soft normal liver and spleen  Extremities:   No edema, clubbing, cyanosis,  Pulses: Femoral and pedal pulses are normal.  Neuro: intact    Medications:   Outpatient Encounter Medications as of 7/9/2019   Medication Sig Dispense Refill    carvedilol (COREG) 25 MG tablet Take 1 tablet by mouth 2 times daily 60 tablet 11    isosorbide dinitrate (ISORDIL) 10 MG tablet Take 1 tablet by mouth 2 times daily 60 tablet 11    lisinopril (PRINIVIL;ZESTRIL) 10 MG tablet Take 1 tablet by mouth daily One daily 30 tablet 11    nitroGLYCERIN (NITROSTAT) 0.4 MG SL tablet Place 1 tablet under the tongue every 5 minutes as needed for Chest pain up to max of 3 total doses.  If no relief after 1 dose, call 911. 25 tablet 5    levothyroxine (SYNTHROID) 125 MCG tablet Take 1 tablet by mouth daily 30 tablet 5    aspirin 81 MG tablet Take 81 mg by mouth daily      ALPRAZolam (XANAX) 0.5 MG tablet Take 1 tablet by mouth nightly as needed for Sleep or Anxiety 30 tablet 0    [DISCONTINUED] carvedilol (COREG) 25 MG tablet Take 1 tablet by mouth 2 times daily 60 tablet 0    [DISCONTINUED] isosorbide dinitrate (ISORDIL) 10 MG tablet Take 1 tablet by mouth 2 times daily 60 tablet 0    [DISCONTINUED] lisinopril

## 2019-09-10 ENCOUNTER — NURSE ONLY (OUTPATIENT)
Dept: CARDIOLOGY CLINIC | Age: 44
End: 2019-09-10
Payer: COMMERCIAL

## 2019-09-10 DIAGNOSIS — Z95.810 ICD (IMPLANTABLE CARDIOVERTER-DEFIBRILLATOR) IN PLACE: ICD-10-CM

## 2019-09-10 DIAGNOSIS — I25.5 ISCHEMIC CARDIOMYOPATHY: ICD-10-CM

## 2019-09-10 PROCEDURE — 93295 DEV INTERROG REMOTE 1/2/MLT: CPT | Performed by: INTERNAL MEDICINE

## 2019-09-10 PROCEDURE — 93296 REM INTERROG EVL PM/IDS: CPT | Performed by: INTERNAL MEDICINE

## 2019-10-25 RX ORDER — LEVOTHYROXINE SODIUM 0.12 MG/1
TABLET ORAL
Qty: 90 TABLET | Refills: 1 | Status: SHIPPED | OUTPATIENT
Start: 2019-10-25 | End: 2020-07-30

## 2019-12-10 ENCOUNTER — NURSE ONLY (OUTPATIENT)
Dept: CARDIOLOGY CLINIC | Age: 44
End: 2019-12-10
Payer: COMMERCIAL

## 2019-12-10 DIAGNOSIS — Z95.810 ICD (IMPLANTABLE CARDIOVERTER-DEFIBRILLATOR) IN PLACE: ICD-10-CM

## 2019-12-10 DIAGNOSIS — I25.5 ISCHEMIC CARDIOMYOPATHY: ICD-10-CM

## 2019-12-10 PROCEDURE — 93295 DEV INTERROG REMOTE 1/2/MLT: CPT | Performed by: INTERNAL MEDICINE

## 2019-12-10 PROCEDURE — 93296 REM INTERROG EVL PM/IDS: CPT | Performed by: INTERNAL MEDICINE

## 2020-01-20 NOTE — PROGRESS NOTES
Inhale 2 puffs into the lungs 2 times daily. 1 Inhaler 5     No facility-administered encounter medications on file as of 1/21/2020. Lab Data:  CBC: No results for input(s): WBC, HGB, HCT, MCV, PLT in the last 72 hours. BMP: No results for input(s): NA, K, CL, CO2, PHOS, BUN, CREATININE in the last 72 hours. Invalid input(s): CA  LIVER PROFILE: No results for input(s): AST, ALT, LIPASE, BILIDIR, BILITOT, ALKPHOS in the last 72 hours. Invalid input(s): AMYLASE,  ALB  LIPID: No results found for: CHOL  No results found for: TRIG  No results found for: HDL  No results found for: LDLCHOLESTEROL, LDLCALC  No results found for: LABVLDL, VLDL  No results found for: CHOLHDLRATIO  PT/INR: No results for input(s): PROTIME, INR in the last 72 hours. A1C: No results found for: LABA1C  BNP:  No results for input(s): BNP in the last 72 hours. IMAGING:   Device check 12/10/19  Carelink transmission shows normal sensing and pacing function. No new arrhythmias    Device check 6/11/19  Carelink transmission shows normal sensing and pacing function. Thoracic impedance trend stable. No new arrhythmias. AP 9.6%  <0.1%    Device check   6/12/18  [employer] normal sensing and pacing function. device check 8/29/17  Carelink transmission shows normal sensing and pacing function. Limited ECHO 3/10/17  Summary   Limited echo for LV function with limited doppler/color. No echo for   comparison.   The left ventricular systolic function is moderately reduced with an   ejection fraction of 35-40%. There is severe hypokinesis of the apical   septal, basal inferoseptal, basal inferior and the entire inferolateral   walls. The apex is akinetic. LV size is upper limits of normal. Grade 1   diastolic dysfunction with normal filling pressures.     CATH 10/13/16  Cath EF Estimated 30 %   Patent LIMA to Diagonal.   Two occluded SVG. Extensive collateral formation from LAD system to RCA.    Moderate global hypokinesis with lateral wall akinesis. Echo 10/15/15 [facility]  Summary   Limited echo for LV function with limited doppler/color. No echo for   comparison. The left ventricular systolic function is moderately reduced with an   ejection fraction of 35-40%. There is severe hypokinesis of the apical   septal, basal inferoseptal, basal inferior and the entire inferolateral   walls. The apex is akinetic. LV size is upper limits of normal. Grade 1   diastolic dysfunction with normal filling pressures. Assessment:  Esdras Perez was seen today for follow-up. Diagnoses and all orders for this visit:    Coronary artery disease involving native coronary artery of native heart with unstable angina pectoris ([address]    Ischemic cardiomyopathy    Essential hypertension    ICD (implantable cardioverter-defibrillator) 11/11/16    Mixed hyperlipidemia        Plan:  1. Will check fasting cmp, lipids, TSH  2. Unable to tolerate statin  3. Remain as active as possible. 4. Follow up with me in 9 months. 5. meds reviewed no refills warranted  6. Will change Isordil to Imdur 30 mg 1/2 tab to see if HA resolves with Isordil, take it after breakfast.     QUALITY MEASURES  1. Tobacco Cessation Counseling: NA  2. Retake of BP if >140/90:   NA  3. Documentation to PCP/referring for new patient:  Sent to PCP at close of office visit  4. CAD patient on anti-platelet: Yes  5. CAD patient on STATIN therapy:  No unable to tolerate statin  6.  Patient with CHF and aFib on anticoagulation:  JIGNESH Dutton MD 1/21/2020 3:05 PM

## 2020-01-21 ENCOUNTER — OFFICE VISIT (OUTPATIENT)
Dept: CARDIOLOGY CLINIC | Age: 45
End: 2020-01-21
Payer: COMMERCIAL

## 2020-01-21 VITALS
DIASTOLIC BLOOD PRESSURE: 70 MMHG | OXYGEN SATURATION: 98 % | SYSTOLIC BLOOD PRESSURE: 110 MMHG | HEART RATE: 74 BPM | BODY MASS INDEX: 30.62 KG/M2 | HEIGHT: 68 IN | WEIGHT: 202 LBS

## 2020-01-21 PROCEDURE — 99214 OFFICE O/P EST MOD 30 MIN: CPT | Performed by: INTERNAL MEDICINE

## 2020-01-21 RX ORDER — NITROGLYCERIN 0.4 MG/1
0.4 TABLET SUBLINGUAL EVERY 5 MIN PRN
Qty: 25 TABLET | Refills: 5 | Status: SHIPPED | OUTPATIENT
Start: 2020-01-21 | End: 2020-09-23

## 2020-01-21 RX ORDER — ISOSORBIDE MONONITRATE 30 MG/1
15 TABLET, EXTENDED RELEASE ORAL DAILY
Qty: 30 TABLET | Refills: 3 | Status: SHIPPED | OUTPATIENT
Start: 2020-01-21

## 2020-01-21 NOTE — PATIENT INSTRUCTIONS
Plan:  1. Will check fasting cmp, lipids, TSH  2. Unable to tolerate statin  3. Remain as active as possible. 4. Follow up with me in 9 months. 5. meds reviewed no refills warranted  6.  Will change Isordil to Imdur 30 mg 1/2 tab to see if HA resolves with Isordil

## 2020-01-21 NOTE — LETTER
 [DISCONTINUED] budesonide-formoterol (SYMBICORT) 160-4.5 MCG/ACT AERO Inhale 2 puffs into the lungs 2 times daily. 1 Inhaler 5     No facility-administered encounter medications on file as of 1/21/2020. Lab Data:  CBC: No results for input(s): WBC, HGB, HCT, MCV, PLT in the last 72 hours. BMP: No results for input(s): NA, K, CL, CO2, PHOS, BUN, CREATININE in the last 72 hours. Invalid input(s): CA  LIVER PROFILE: No results for input(s): AST, ALT, LIPASE, BILIDIR, BILITOT, ALKPHOS in the last 72 hours. Invalid input(s): AMYLASE,  ALB  LIPID: No results found for: CHOL  No results found for: TRIG  No results found for: HDL  No results found for: LDLCHOLESTEROL, LDLCALC  No results found for: LABVLDL, VLDL  No results found for: CHOLHDLRATIO  PT/INR: No results for input(s): PROTIME, INR in the last 72 hours. A1C: No results found for: LABA1C  BNP:  No results for input(s): BNP in the last 72 hours. IMAGING:   Device check 12/10/19  Carelink transmission shows normal sensing and pacing function. No new arrhythmias    Device check 6/11/19  Carelink transmission shows normal sensing and pacing function. Thoracic impedance trend stable. No new arrhythmias. AP 9.6%  <0.1%    Device check   6/12/18  [employer] normal sensing and pacing function. device check 8/29/17  Carelink transmission shows normal sensing and pacing function. Limited ECHO 3/10/17  Summary   Limited echo for LV function with limited doppler/color. No echo for   comparison.   The left ventricular systolic function is moderately reduced with an   ejection fraction of 35-40%. There is severe hypokinesis of the apical   septal, basal inferoseptal, basal inferior and the entire inferolateral   walls. The apex is akinetic. LV size is upper limits of normal. Grade 1   diastolic dysfunction with normal filling pressures.     CATH 10/13/16  Cath EF Estimated 30 %   Patent LIMA to Diagonal.   Two occluded SVG.

## 2020-01-22 ENCOUNTER — TELEPHONE (OUTPATIENT)
Dept: CARDIOLOGY CLINIC | Age: 45
End: 2020-01-22

## 2020-01-22 NOTE — TELEPHONE ENCOUNTER
Leni 80 with [facility] message pharmacist reports pill is scored and will fill RX .   Will notify Mr. Patel to try this 1st and try to move up to 1 pill

## 2020-01-22 NOTE — TELEPHONE ENCOUNTER
I have done it in past for short periods And he can do that to start out but once he gets use to half tablet he can move on to whole tablet, If he feels uncomfortable splitting it he can start with whole tablet once a day after meal.

## 2020-03-10 ENCOUNTER — NURSE ONLY (OUTPATIENT)
Dept: CARDIOLOGY CLINIC | Age: 45
End: 2020-03-10
Payer: COMMERCIAL

## 2020-03-10 PROCEDURE — 93295 DEV INTERROG REMOTE 1/2/MLT: CPT | Performed by: INTERNAL MEDICINE

## 2020-03-10 PROCEDURE — 93296 REM INTERROG EVL PM/IDS: CPT | Performed by: INTERNAL MEDICINE

## 2020-03-10 NOTE — PROGRESS NOTES
Carelink transmission shows normal sensing and pacing function. See interrogation for more details. Episodes Since: 10-Dec-2019  1 Non-sustained VT (coreg). Follow up in 3 months via carelink.

## 2020-03-10 NOTE — LETTER
[address] [place] [employer] [phone] [address] [facility],4Th Floor [phone]    Pacemaker/Defibrillator Clinic          03/10/20        [address] [place] [id number]        Dear Artem Murray    This letter is to inform you that we received the transmission from your monitor at home that checks your implanted heart device. The next date your monitor will automatically transmit will be 6/10. If your report needs attention we will notify you. Your device and monitor are wireless and most transmit cellularly, but please periodically check your monitor is still plugged in to the electrical outlet. If you still use the telephone land line to send please ensure the connection to the phone lamar is secure. This will help to ensure successful automatic transmissions in the future. Also, the monitor needs to be close to you while sleeping at night. Please be aware that the remote device transmission sites are periodically monitored only during regular business hours during which simultaneous in-office device clinics are being run. If your transmission requires attention, we will contact you as soon as possible. Thank you.             [facility] ([place]

## 2020-06-10 ENCOUNTER — NURSE ONLY (OUTPATIENT)
Dept: CARDIOLOGY CLINIC | Age: 45
End: 2020-06-10
Payer: COMMERCIAL

## 2020-06-10 PROCEDURE — 93295 DEV INTERROG REMOTE 1/2/MLT: CPT | Performed by: INTERNAL MEDICINE

## 2020-06-10 PROCEDURE — 93296 REM INTERROG EVL PM/IDS: CPT | Performed by: INTERNAL MEDICINE

## 2020-07-31 RX ORDER — LISINOPRIL 10 MG/1
TABLET ORAL
Qty: 90 TABLET | Refills: 3 | Status: SHIPPED | OUTPATIENT
Start: 2020-07-31

## 2020-07-31 RX ORDER — CARVEDILOL 25 MG/1
TABLET ORAL
Qty: 180 TABLET | Refills: 3 | Status: SHIPPED | OUTPATIENT
Start: 2020-07-31

## 2020-07-31 RX ORDER — LEVOTHYROXINE SODIUM 0.12 MG/1
TABLET ORAL
Qty: 90 TABLET | Refills: 3 | Status: SHIPPED | OUTPATIENT
Start: 2020-07-31

## 2020-09-09 ENCOUNTER — NURSE ONLY (OUTPATIENT)
Dept: CARDIOLOGY CLINIC | Age: 45
End: 2020-09-09
Payer: COMMERCIAL

## 2020-09-09 PROCEDURE — 93296 REM INTERROG EVL PM/IDS: CPT | Performed by: INTERNAL MEDICINE

## 2020-09-09 PROCEDURE — 93295 DEV INTERROG REMOTE 1/2/MLT: CPT | Performed by: INTERNAL MEDICINE

## 2020-09-23 RX ORDER — NITROGLYCERIN 0.4 MG/1
TABLET SUBLINGUAL
Qty: 25 TABLET | Refills: 4 | Status: SHIPPED | OUTPATIENT
Start: 2020-09-23 | End: 2021-05-07

## 2021-01-04 ENCOUNTER — NURSE ONLY (OUTPATIENT)
Dept: CARDIOLOGY CLINIC | Age: 46
End: 2021-01-04
Payer: COMMERCIAL

## 2021-01-04 DIAGNOSIS — I25.5 ISCHEMIC CARDIOMYOPATHY: ICD-10-CM

## 2021-01-04 DIAGNOSIS — Z95.810 ICD (IMPLANTABLE CARDIOVERTER-DEFIBRILLATOR) IN PLACE: ICD-10-CM

## 2021-01-04 PROCEDURE — 93296 REM INTERROG EVL PM/IDS: CPT | Performed by: INTERNAL MEDICINE

## 2021-01-04 PROCEDURE — 93295 DEV INTERROG REMOTE 1/2/MLT: CPT | Performed by: INTERNAL MEDICINE

## 2021-01-04 NOTE — LETTER
[address] [phone]  [address] [facility],4Th Floor [phone]    Pacemaker/Defibrillator Clinic          01/04/21        [address] [place] [address]        Dear Zurdo Morrison    This letter is to inform you that we received the transmission from your monitor at home that checks your implanted heart device. The next date your monitor will automatically transmit will be 4/8/2021. If your report needs attention we will notify you. Your device and monitor are wireless and most transmit cellularly, but please periodically check your monitor is still plugged in to the electrical outlet. If you still use the telephone land line to send please ensure the connection to the phone lamar is secure. This will help to ensure successful automatic transmissions in the future. Also, the monitor needs to be close to you while sleeping at night. Please be aware that the remote device transmission sites are periodically monitored only during regular business hours during which simultaneous in-office device clinics are being run. If your transmission requires attention, we will contact you as soon as possible. Thank you.             Diane [address]

## 2021-01-04 NOTE — PROGRESS NOTES
We received remote transmission from patient's monitor at home. Transmission shows normal sensing and pacing function. EP physician will review. See interrogation under cardiology tab in the [facility] [address] field for more details. Thoracic impedance trend stable. Episodes Since: 10-Sep-2020  1 Non-sustained VT x 4 sec. (coreg)  Follow up in 3 months via [facility]link.

## 2021-04-08 ENCOUNTER — NURSE ONLY (OUTPATIENT)
Dept: CARDIOLOGY CLINIC | Age: 46
End: 2021-04-08
Payer: COMMERCIAL

## 2021-04-08 DIAGNOSIS — Z95.810 ICD (IMPLANTABLE CARDIOVERTER-DEFIBRILLATOR) IN PLACE: ICD-10-CM

## 2021-04-08 DIAGNOSIS — I25.5 ISCHEMIC CARDIOMYOPATHY: ICD-10-CM

## 2021-04-08 NOTE — LETTER
[address] [employer] [phone]  [address] [facility],4Th Floor [phone]    Pacemaker/Defibrillator Clinic          04/08/21        [address] [place] [address]        Dear Awais Cuellar    This letter is to inform you that we received the transmission from your monitor at home that checks your implanted heart device. The next date your monitor will automatically transmit will be 7/12. If your report needs attention we will notify you. Your device and monitor are wireless and most transmit cellularly, but please periodically check your monitor is still plugged in to the electrical outlet. If you still use the telephone land line to send please ensure the connection to the phone lamar is secure. This will help to ensure successful automatic transmissions in the future. Also, the monitor needs to be close to you while sleeping at night. Please be aware that the remote device transmission sites are periodically monitored only during regular business hours during which simultaneous in-office device clinics are being run. If your transmission requires attention, we will contact you as soon as possible. Thank you.             Diane [address]

## 2021-05-07 RX ORDER — NITROGLYCERIN 0.4 MG/1
TABLET SUBLINGUAL
Qty: 25 TABLET | Refills: 3 | Status: SHIPPED | OUTPATIENT
Start: 2021-05-07

## 2021-05-12 PROCEDURE — 93295 DEV INTERROG REMOTE 1/2/MLT: CPT | Performed by: INTERNAL MEDICINE

## 2021-05-12 PROCEDURE — 93296 REM INTERROG EVL PM/IDS: CPT | Performed by: INTERNAL MEDICINE

## 2021-07-12 ENCOUNTER — NURSE ONLY (OUTPATIENT)
Dept: CARDIOLOGY CLINIC | Age: 46
End: 2021-07-12

## 2021-07-12 DIAGNOSIS — I25.5 ISCHEMIC CARDIOMYOPATHY: ICD-10-CM

## 2021-07-12 DIAGNOSIS — Z95.810 ICD (IMPLANTABLE CARDIOVERTER-DEFIBRILLATOR) IN PLACE: ICD-10-CM

## 2021-07-12 PROCEDURE — 93296 REM INTERROG EVL PM/IDS: CPT | Performed by: INTERNAL MEDICINE

## 2021-07-12 PROCEDURE — 93295 DEV INTERROG REMOTE 1/2/MLT: CPT | Performed by: INTERNAL MEDICINE

## 2021-07-12 NOTE — LETTER
[address] [phone] [address] [facility],4Th Floor [phone]    Pacemaker/Defibrillator Clinic    07/15/21      [address] [place] [address]      Dear Memo Steen    This letter is to inform you that we received the transmission from your monitor at home that checks your implanted heart device. The next date your monitor will automatically transmit will be 10/11. If your report needs attention we will notify you. Your device and monitor are wireless and most transmit cellularly, but please periodically check your monitor is still plugged in to the electrical outlet. If you still use the telephone land line to send please ensure the connection to the phone lamar is secure. This will help to ensure successful automatic transmissions in the future. Also, the monitor needs to be close to you while sleeping at night. Please be aware that the remote device transmission sites are periodically monitored only during regular business hours during which simultaneous in-office device clinics are being run. If your transmission requires attention, we will contact you as soon as possible. **PLEASE NOTE** that our [facility] policy and processes are changing to ensure a more seamless approach for all parties involved, allowing more time for our nurses to address patient issues and concerns. We will no longer be sending letters for NORMAL remote transmissions. You will be contacted by phone if your transmission requires attention (as previously done), and letters will only be sent regarding monitor disconnections or missed transmissions if you are unable to be reached by phone. Please do not be alarmed by this new process, as we will continue to contact you if your transmission report requires attention. This will be your final \"remote received\" letter.   From this point forward, the [facility] will be

## 2021-07-15 NOTE — PROGRESS NOTES
Remote transmission received for patients dual chamber ICD. Transmission shows normal sensing and pacing function. EP physician will review. See interrogation under the cardiology tab in the [address] [place] [address] [address] field for more details. Will continue to monitor remotely. No new arrhythmias. Thoracic impedance trend stable.

## 2021-10-11 ENCOUNTER — NURSE ONLY (OUTPATIENT)
Dept: CARDIOLOGY CLINIC | Age: 46
End: 2021-10-11
Payer: COMMERCIAL

## 2021-10-11 DIAGNOSIS — I25.5 ISCHEMIC CARDIOMYOPATHY: ICD-10-CM

## 2021-10-11 DIAGNOSIS — Z95.810 ICD (IMPLANTABLE CARDIOVERTER-DEFIBRILLATOR) IN PLACE: ICD-10-CM

## 2021-10-11 PROCEDURE — 93296 REM INTERROG EVL PM/IDS: CPT | Performed by: INTERNAL MEDICINE

## 2021-10-11 PROCEDURE — 93295 DEV INTERROG REMOTE 1/2/MLT: CPT | Performed by: INTERNAL MEDICINE

## 2021-10-12 NOTE — PROGRESS NOTES
Remote transmission received for patients dual chamber ICD. Transmission shows normal sensing and pacing function. EP physician will review. See interrogation under the cardiology tab in the [address] [place] [address] [address] field for more details. Will continue to monitor remotely. No  arrhythmias recorded. Thoracic impedance trend stable.

## 2022-01-10 ENCOUNTER — NURSE ONLY (OUTPATIENT)
Dept: CARDIOLOGY CLINIC | Age: 47
End: 2022-01-10
Payer: COMMERCIAL

## 2022-01-10 DIAGNOSIS — Z95.810 ICD (IMPLANTABLE CARDIOVERTER-DEFIBRILLATOR) IN PLACE: ICD-10-CM

## 2022-01-10 DIAGNOSIS — I25.5 ISCHEMIC CARDIOMYOPATHY: ICD-10-CM

## 2022-01-10 PROCEDURE — 93295 DEV INTERROG REMOTE 1/2/MLT: CPT | Performed by: INTERNAL MEDICINE

## 2022-01-10 PROCEDURE — 93296 REM INTERROG EVL PM/IDS: CPT | Performed by: INTERNAL MEDICINE

## 2022-01-11 NOTE — PROGRESS NOTES
Remote transmission received for patients dual chamber ICD. Transmission shows normal sensing and pacing function. EP physician will review. See interrogation under the cardiology tab in the [address] [place] [address] [address] field for more details. Will continue to monitor remotely. Episodes Since: 11-Oct-2021  1 Non-sustained VT (coreg). Thoracic impedance trend stable.

## 2022-04-11 ENCOUNTER — NURSE ONLY (OUTPATIENT)
Dept: CARDIOLOGY CLINIC | Age: 47
End: 2022-04-11
Payer: COMMERCIAL

## 2022-04-11 DIAGNOSIS — I25.5 ISCHEMIC CARDIOMYOPATHY: ICD-10-CM

## 2022-04-11 DIAGNOSIS — Z95.810 ICD (IMPLANTABLE CARDIOVERTER-DEFIBRILLATOR) IN PLACE: ICD-10-CM

## 2022-04-12 NOTE — PROGRESS NOTES
Remote transmission received for patients dual chamber ICD. Transmission shows normal sensing and pacing function. EP physician will review. See interrogation under the cardiology tab in the [facility] [address] field for more details. Will continue to monitor remotely. Hx NSVT (coreg). No  arrhythmias recorded. Thoracic impedance trend stable.

## 2022-04-14 PROCEDURE — 93296 REM INTERROG EVL PM/IDS: CPT | Performed by: INTERNAL MEDICINE

## 2022-04-14 PROCEDURE — 93295 DEV INTERROG REMOTE 1/2/MLT: CPT | Performed by: INTERNAL MEDICINE

## 2022-07-11 ENCOUNTER — NURSE ONLY (OUTPATIENT)
Dept: CARDIOLOGY CLINIC | Age: 47
End: 2022-07-11
Payer: COMMERCIAL

## 2022-07-11 DIAGNOSIS — Z95.810 ICD (IMPLANTABLE CARDIOVERTER-DEFIBRILLATOR) IN PLACE: ICD-10-CM

## 2022-07-11 DIAGNOSIS — I25.5 ISCHEMIC CARDIOMYOPATHY: ICD-10-CM

## 2022-07-11 PROCEDURE — 93295 DEV INTERROG REMOTE 1/2/MLT: CPT | Performed by: INTERNAL MEDICINE

## 2022-07-11 PROCEDURE — 93296 REM INTERROG EVL PM/IDS: CPT | Performed by: INTERNAL MEDICINE

## 2022-07-13 NOTE — PROGRESS NOTES
Remote transmission received for patients dual chamber ICD. Transmission shows normal sensing and pacing function. EP physician will review. See interrogation under the cardiology tab in the [facility] [address] field for more details. Will continue to monitor remotely. Hx NSVT (coreg). Noted NSVT. Thoracic impedance trend stable. End of 91-day monitoring period 7-11-22.

## 2022-10-10 ENCOUNTER — NURSE ONLY (OUTPATIENT)
Dept: CARDIOLOGY CLINIC | Age: 47
End: 2022-10-10
Payer: COMMERCIAL

## 2022-10-10 DIAGNOSIS — Z95.810 ICD (IMPLANTABLE CARDIOVERTER-DEFIBRILLATOR) IN PLACE: ICD-10-CM

## 2022-10-10 DIAGNOSIS — I42.9 CARDIOMYOPATHY, UNSPECIFIED TYPE (HCC): Primary | ICD-10-CM

## 2022-10-10 PROCEDURE — 93295 DEV INTERROG REMOTE 1/2/MLT: CPT | Performed by: INTERNAL MEDICINE

## 2022-10-10 PROCEDURE — 93296 REM INTERROG EVL PM/IDS: CPT | Performed by: INTERNAL MEDICINE

## 2022-10-13 NOTE — PROGRESS NOTES
We received remote transmission from patient's monitor at home. Transmission shows normal sensing and pacing function. Noted NSVT. Pt is on Coreg. EP physician will review. See interrogation under cardiology tab in the [address] [place] [address] [address] field for more details. Total  < 0.1% (MVP On)  AS-VS 82.2%  AS- < 0.1%  AP-VS 17.8%  AP- < 0.1%    Optivol is within normal range. End of 91-day monitoring period 10-10-22.

## 2022-11-07 ENCOUNTER — TELEPHONE (OUTPATIENT)
Dept: CARDIOLOGY CLINIC | Age: 47
End: 2022-11-07

## 2022-11-07 NOTE — TELEPHONE ENCOUNTER
Echo available in [facility] for RK review- 11/1/2022    Please review and advise

## 2022-11-07 NOTE — TELEPHONE ENCOUNTER
Pt called into office today and stated he feels he needs a sooner appt to see RKG. Pt had recent echo done referred by his PCP, PCP saw some concerns on echo, echo done through [facility]. Pt also stated PCP told him he has 30%EF. Pt requesting RKG to review this echo and see if he needs to be seen sooner as pt is concerned about his heart at this point.

## 2022-11-08 ENCOUNTER — HOSPITAL ENCOUNTER (EMERGENCY)
Age: 47
Discharge: HOME | End: 2022-11-09
Attending: EMERGENCY MEDICINE
Payer: MEDICAID

## 2022-11-08 ENCOUNTER — APPOINTMENT (OUTPATIENT)
Dept: CT IMAGING | Age: 47
End: 2022-11-08
Payer: MEDICAID

## 2022-11-08 DIAGNOSIS — S09.90XA INJURY OF HEAD, INITIAL ENCOUNTER: Primary | ICD-10-CM

## 2022-11-08 PROCEDURE — 70450 CT HEAD/BRAIN W/O DYE: CPT

## 2022-11-08 PROCEDURE — 72125 CT NECK SPINE W/O DYE: CPT

## 2022-11-08 PROCEDURE — 99284 EMERGENCY DEPT VISIT MOD MDM: CPT

## 2022-11-08 PROCEDURE — 6370000000 HC RX 637 (ALT 250 FOR IP): Performed by: EMERGENCY MEDICINE

## 2022-11-08 RX ORDER — ONDANSETRON 4 MG/1
8 TABLET, ORALLY DISINTEGRATING ORAL ONCE
Status: COMPLETED | OUTPATIENT
Start: 2022-11-08 | End: 2022-11-08

## 2022-11-08 RX ORDER — METHOCARBAMOL 750 MG/1
750 TABLET, FILM COATED ORAL 4 TIMES DAILY
Qty: 40 TABLET | Refills: 0 | Status: SHIPPED | OUTPATIENT
Start: 2022-11-08 | End: 2022-11-18

## 2022-11-08 RX ADMIN — ONDANSETRON 8 MG: 4 TABLET, ORALLY DISINTEGRATING ORAL at 23:08

## 2022-11-08 RX ADMIN — ONDANSETRON 8 MG: 4 TABLET, ORALLY DISINTEGRATING ORAL at 21:05

## 2022-11-08 ASSESSMENT — PAIN - FUNCTIONAL ASSESSMENT: PAIN_FUNCTIONAL_ASSESSMENT: 0-10

## 2022-11-08 ASSESSMENT — PAIN DESCRIPTION - DESCRIPTORS: DESCRIPTORS: THROBBING

## 2022-11-08 ASSESSMENT — PAIN DESCRIPTION - LOCATION: LOCATION: HEAD;NECK

## 2022-11-08 ASSESSMENT — PAIN SCALES - GENERAL: PAINLEVEL_OUTOF10: 7

## 2022-11-08 NOTE — TELEPHONE ENCOUNTER
11/08 pt requesting returned call from [place] or [facility], please advise

## 2022-11-08 NOTE — TELEPHONE ENCOUNTER
Heart function is slightly weaker compared to 2017  He has not been in to see me for about 2 yrs  You can add his name to schedule may double book.

## 2022-11-08 NOTE — Clinical Note
Leopoldo Nestle was seen and treated in our emergency department on 11/8/2022. He may return to work on 11/10/2022. If you have any questions or concerns, please don't hesitate to call.       Andrés Singh MD

## 2022-11-09 VITALS
TEMPERATURE: 97.1 F | HEART RATE: 80 BPM | BODY MASS INDEX: 26.98 KG/M2 | WEIGHT: 178 LBS | OXYGEN SATURATION: 99 % | DIASTOLIC BLOOD PRESSURE: 68 MMHG | HEIGHT: 68 IN | RESPIRATION RATE: 18 BRPM | SYSTOLIC BLOOD PRESSURE: 150 MMHG

## 2022-11-09 NOTE — ED PROVIDER NOTES
CHIEF COMPLAINT  Head Injury (Top of pt's head was hit by glass doors 4 hours ago. States tenderness in head, neck pain.)      HISTORY OF PRESENT ILLNESS  Valentina Phillips is a 52 y.o. male with a history of hypertension, hyperlipidemia, anxiety presenting for evaluation of head injury. Patient states that a glass door hit him on the top of the head about 4 hours ago. He states that he had initially gone home however had mild nausea without vomiting, headache and decided to come in. He does report prior TBI. He states that he has pain radiating on both sides of the neck. Denies any new numbness, tingling or weakness in the arms or legs. He is not on blood thinners. No loss of consciousness. No other complaints, modifying factors or associated symptoms. I have reviewed the following from the nursing documentation.    Past Medical History:   Diagnosis Date    Anxious mood     Fatigue     Gouty arthritis     Hyperlipidemia     Hypertension     Hypothyroidism     Other (abnormal) findings on radiological examination of breast     difibulator placment end of Oct 2016, Dr. Neal Ann    Sexual dysfunction      Past Surgical History:   Procedure Laterality Date    CARDIAC DEFIBRILLATOR PLACEMENT       Family History   Problem Relation Age of Onset    High Blood Pressure Mother     Heart Disease Father      Social History     Socioeconomic History    Marital status: Single     Spouse name: Not on file    Number of children: 2    Years of education: 12    Highest education level: Not on file   Occupational History    Occupation: sales     Employer: [employer]   Tobacco Use    Smoking status: Never    Smokeless tobacco: Never   Vaping Use    Vaping Use: Never used   Substance and Sexual Activity    Alcohol use: No     Alcohol/week: 0.0 standard drinks    Drug use: No    Sexual activity: Yes     Partners: Female   Other Topics Concern    Not on file   Social History Narrative    Not on file     Social Determinants of Health Financial Resource Strain: Not on file   Food Insecurity: Not on file   Transportation Needs: Not on file   Physical Activity: Not on file   Stress: Not on file   Social Connections: Not on file   Intimate Partner Violence: Not on file   Housing Stability: Not on file     No current facility-administered medications for this encounter. Current Outpatient Medications   Medication Sig Dispense Refill    nitroGLYCERIN (NITROSTAT) 0.4 MG SL tablet DISSOLVE 1 TAB UNDER TONGUE FOR CHEST PAIN - IF PAIN REMAINS AFTER 5 MIN, CALL 911 AND REPEAT DOSE. MAX 3 TABS IN 15 MINUTES 25 tablet 3    lisinopril (PRINIVIL;ZESTRIL) 10 MG tablet TAKE ONE TABLET BY MOUTH DAILY 90 tablet 3    levothyroxine (SYNTHROID) 125 MCG tablet TAKE ONE TABLET BY MOUTH DAILY 90 tablet 3    carvedilol (COREG) 25 MG tablet TAKE ONE TABLET BY MOUTH TWICE A  tablet 3    isosorbide mononitrate (IMDUR) 30 MG extended release tablet Take 0.5 tablets by mouth daily 30 tablet 3    albuterol sulfate HFA (PROVENTIL HFA) 108 (90 Base) MCG/ACT inhaler Inhale 2 puffs into the lungs every 6 hours as needed for Wheezing or Shortness of Breath With spacer (and mask if indicated). Thanks. 1 Inhaler 3    aspirin 81 MG tablet Take 81 mg by mouth daily       Allergies   Allergen Reactions    Pcn [Penicillins] Hives    Pravastatin     Simvastatin        REVIEW OF SYSTEMS  Positive and pertinent negatives as per HPI. All other systems were reviewed and are negative. PHYSICAL EXAM  BP (!) 174/100   Pulse 75   Temp 97.1 °F (36.2 °C) (Infrared)   Resp 16   Ht 5' 8\" (1.727 m)   Wt 178 lb (80.7 kg)   SpO2 100%   BMI 27.06 kg/m²   GENERAL APPEARANCE: Awake and alert. Cooperative. HEAD: Normocephalic. Atraumatic. There is no midline C-spine tenderness, there is bilateral paraspinal cervical muscle tenderness, trapezius muscle tenderness  EYES: PERRL. EOM's grossly intact. NECK: Supple, trachea midline. HEART: RRR. No harsh murmurs.  Intact radial pulses 2+ bilaterally. LUNGS: Respirations unlabored without accessory muscle use. CTAB. Good air exchange. No wheezes, rales, or rhonchi. Speaking comfortably in full sentences. EXTREMITIES: No peripheral edema. No acute deformities. SKIN: Warm and dry. No acute rashes. NEUROLOGICAL: Alert and oriented X 3. CN II-XII intact. No gross facial drooping. Strength 5/5, sensation intact. No focal deficit    LABS  I have reviewed all labs for this visit. No results found for this visit on 11/08/22. RADIOLOGY  X-RAYS:  I have reviewed radiologic plain film image(s). ALL OTHER NON-PLAIN FILM IMAGES SUCH AS CT, ULTRASOUND AND MRI HAVE BEEN READ BY THE RADIOLOGIST. CT HEAD WO CONTRAST    (Results Pending)   CT CERVICAL SPINE WO CONTRAST    (Results Pending)          No results found. During the patient's ED course, the patient was given:  Medications   ondansetron (ZOFRAN-ODT) disintegrating tablet 8 mg (8 mg Oral Given 11/8/22 2105)        ED COURSE/MDM  Patient seen and evaluated. Patient presenting after evaluation of head injury after a glass door had dropped on his head. He has no focal neurological deficits. He has reproducible muscular tenderness of the neck, trapezius. We will obtain CT head, C-spine imaging to rule out acute intracranial process, bleed. He was given Zofran for nausea. He has no focal neurological deficits. CT head, C-spine without traumatic abnormality. He will be prescribed Robaxin advised on Tylenol and ibuprofen and PCP follow-up. Suspect closed head injury, muscle strain/spasm to explain his symptoms. The patient will be discharged from the emergency department. The patient was counseled on their diagnosis and any medications prescribed. They were advised on the need for PCP followup. They were counseled on the need to return to the emergency department if any of their symptoms were to worsen, change or have any other concerns. Discharged in stable condition. Patient was given scripts for the following medications. I counseled patient how to take these medications. New Prescriptions    No medications on file       CLINICAL IMPRESSION  1. Injury of head, initial encounter        Blood pressure (!) 174/100, pulse 75, temperature 97.1 °F (36.2 °C), temperature source Infrared, resp. rate 16, height 5' 8\" (1.727 m), weight 178 lb (80.7 kg), SpO2 100 %. DISPOSITION  Carine Red was discharged to home in stable condition. This chart was generated in part by using Dragon Dictation system and may contain errors related to that system including errors in grammar, punctuation, and spelling, as well as words and phrases that may be inappropriate. If there are any questions or concerns please feel free to contact the dictating provider for clarification.        Alexus Ca MD  11/08/22 8525

## 2022-11-09 NOTE — DISCHARGE INSTRUCTIONS
The CAT scan did not show any traumatic injury to the brain or the neck. You may take Tylenol, ibuprofen as well as muscle relaxer to help with the pain discomfort.   Return for worsening symptoms, nausea, vomiting or weakness, numbness in the arms or legs

## 2022-11-10 ENCOUNTER — OFFICE VISIT (OUTPATIENT)
Dept: CARDIOLOGY CLINIC | Age: 47
End: 2022-11-10
Payer: MEDICAID

## 2022-11-10 VITALS
HEART RATE: 73 BPM | OXYGEN SATURATION: 98 % | DIASTOLIC BLOOD PRESSURE: 80 MMHG | SYSTOLIC BLOOD PRESSURE: 118 MMHG | WEIGHT: 183 LBS | BODY MASS INDEX: 27.74 KG/M2 | HEIGHT: 68 IN

## 2022-11-10 DIAGNOSIS — Z95.810 ICD (IMPLANTABLE CARDIOVERTER-DEFIBRILLATOR) IN PLACE: ICD-10-CM

## 2022-11-10 DIAGNOSIS — I10 PRIMARY HYPERTENSION: ICD-10-CM

## 2022-11-10 DIAGNOSIS — E78.00 PURE HYPERCHOLESTEROLEMIA: ICD-10-CM

## 2022-11-10 DIAGNOSIS — I49.9 IRREGULAR HEART RATE: Primary | ICD-10-CM

## 2022-11-10 DIAGNOSIS — I25.110 CORONARY ARTERY DISEASE INVOLVING NATIVE CORONARY ARTERY OF NATIVE HEART WITH UNSTABLE ANGINA PECTORIS (HCC): ICD-10-CM

## 2022-11-10 DIAGNOSIS — I10 ESSENTIAL HYPERTENSION: ICD-10-CM

## 2022-11-10 DIAGNOSIS — I25.5 ISCHEMIC CARDIOMYOPATHY: ICD-10-CM

## 2022-11-10 PROCEDURE — 99214 OFFICE O/P EST MOD 30 MIN: CPT | Performed by: INTERNAL MEDICINE

## 2022-11-10 PROCEDURE — 93000 ELECTROCARDIOGRAM COMPLETE: CPT | Performed by: INTERNAL MEDICINE

## 2022-11-10 PROCEDURE — 3074F SYST BP LT 130 MM HG: CPT | Performed by: INTERNAL MEDICINE

## 2022-11-10 PROCEDURE — 3078F DIAST BP <80 MM HG: CPT | Performed by: INTERNAL MEDICINE

## 2022-11-10 RX ORDER — ASPIRIN 81 MG/1
81 TABLET ORAL DAILY
Qty: 90 TABLET | Refills: 1 | Status: SHIPPED | OUTPATIENT
Start: 2022-11-10

## 2022-11-10 RX ORDER — PREDNISONE 20 MG/1
20 TABLET ORAL DAILY
COMMUNITY
Start: 2022-10-17

## 2022-11-10 RX ORDER — CARVEDILOL 25 MG/1
TABLET ORAL
Qty: 180 TABLET | Refills: 3 | Status: SHIPPED | OUTPATIENT
Start: 2022-11-10

## 2022-11-10 RX ORDER — GABAPENTIN 600 MG/1
600 TABLET ORAL 2 TIMES DAILY
COMMUNITY
Start: 2022-10-17 | End: 2022-11-16

## 2022-11-10 RX ORDER — EPLERENONE 25 MG/1
25 TABLET, FILM COATED ORAL DAILY
Qty: 90 TABLET | Refills: 3 | Status: SHIPPED | OUTPATIENT
Start: 2022-11-10 | End: 2022-11-21

## 2022-11-10 RX ORDER — METFORMIN HYDROCHLORIDE 500 MG/1
500 TABLET, EXTENDED RELEASE ORAL EVERY MORNING
COMMUNITY
Start: 2022-10-27

## 2022-11-10 RX ORDER — ISOSORBIDE MONONITRATE 30 MG/1
15 TABLET, EXTENDED RELEASE ORAL DAILY
Qty: 90 TABLET | Refills: 1 | Status: SHIPPED | OUTPATIENT
Start: 2022-11-10

## 2022-11-10 NOTE — PROGRESS NOTES
[address] Office Note  11/10/2022     Subjective:  Mr. Sergei Steward presents for follow up for CAD with CABG and dual chamber ICD management. [place]:    Today, patient reports he is here today due to swollen legs and SOB which he has been experiencing on and off for 2 years. His PCP recommended an echocardiogram which was performed 1/1/2022. He states he takes 2-3 nitros per week for chest pain. This is chronic  pattern. He reports he has having difficulty getting a job because he cannot retain information. His PCP has recommends disability allowance due to his health care issues. He state she was recently diagnosed with diabetes. He state she stopped taking Asprin and Imdur 2 years ago. He takes his Lisinopril. He is non compliant with follow up in office. PMH:  Amee Junior is a 52 y.o. male who presents for evaluation of CAD with CABG in 2007. He had cardiac arrest on 10/13/16 due to arrhythmia while at work. He underwent repeat cardiac cath on 10/13/16 which showed patent LIMA to diagonal,two occluded SVG, extensive collateral formation from LAD system to RCA. Moderate global hypokinesis with lateral wall akinesis. His EF was 30-35%. He underwent dual chamber ICD placement at [facility] on 11/11/16. At last OV 1/12/2020 he stated he does not like taking isordil due to splitting headache and he is afraid of stress test since he collapsed on the first one before his CABG. Review of Systems:  12 point ROS negative in all areas as listed below except as in [place]  Constitutional, EENT, pulmonary, GI, , Musculoskeletal, skin, neurological, hematological, endocrine, Psychiatric    Reviewed past medical history, social, and family history. Smoker:nonsmoker  Alcohol:no alcohol  Mother No heart disease  Father heart attacks. CVA  Past Medical History:   Diagnosis Date    Anxious mood     Fatigue     Gouty arthritis     Hyperlipidemia     Hypertension     Hypothyroidism     Other (abnormal) findings on radiological examination of breast     difibulator placment end of Oct 2016, Dr. Catherine Walsh    Sexual dysfunction      Past Surgical History:   Procedure Laterality Date    CARDIAC DEFIBRILLATOR PLACEMENT         Objective:   /80   Pulse 73   Ht 5' 8\" (1.727 m)   Wt 183 lb (83 kg)   SpO2 98%   BMI 27.83 kg/m²     Wt Readings from Last 3 Encounters:   11/10/22 183 lb (83 kg)   11/08/22 178 lb (80.7 kg)   01/21/20 202 lb (91.6 kg)       Physical Exam:  General: No Respiratory distress, appears well developed and well nourished. Eyes:  Sclera nonicteric  Nose/Sinuses:  negative findings: nose shows no deformity, asymmetry, or inflammation, nasal mucosa normal, septum midline with no perforation or bleeding  Back:  no pain to palpation  Joint:  no active joint inflammation  Musculoskeletal:  negative  Skin:  Warm and dry  Neck:  Negative for JVD and Carotid Bruits. Chest:  Clear to auscultation, respiration easy  Cardiovascular:  RRR, S1S2 normal, no murmur, no rub or thrill. Abdomen:  Soft normal liver and spleen  Extremities:   No edema, clubbing, cyanosis,  Pulses:  pedal pulses are normal.  Neuro: intact    Medications:   Outpatient Encounter Medications as of 11/10/2022   Medication Sig Dispense Refill    gabapentin (NEURONTIN) 600 MG tablet Take 600 mg by mouth in the morning and at bedtime.       metFORMIN (GLUCOPHAGE-XR) 500 MG extended release tablet Take 500 mg by mouth every morning      predniSONE (DELTASONE) 20 MG tablet Take 20 mg by mouth daily      isosorbide mononitrate (IMDUR) 30 MG extended release tablet Take 0.5 tablets by mouth daily 90 tablet 1    eplerenone (INSPRA) 25 MG tablet Take 1 tablet by mouth daily 90 tablet 3    aspirin EC 81 MG EC tablet Take 1 tablet by mouth daily 90 tablet 1    carvedilol (COREG) 25 MG tablet TAKE ONE TABLET BY MOUTH TWICE A  tablet 3    sacubitril-valsartan (ENTRESTO) 24-26 MG per tablet Take 1 tablet by mouth 2 times daily 60 tablet 3 methocarbamol (ROBAXIN-750) 750 MG tablet Take 1 tablet by mouth 4 times daily for 10 days 40 tablet 0    nitroGLYCERIN (NITROSTAT) 0.4 MG SL tablet DISSOLVE 1 TAB UNDER TONGUE FOR CHEST PAIN - IF PAIN REMAINS AFTER 5 MIN, CALL 911 AND REPEAT DOSE. MAX 3 TABS IN 15 MINUTES 25 tablet 3    levothyroxine (SYNTHROID) 125 MCG tablet TAKE ONE TABLET BY MOUTH DAILY 90 tablet 3    albuterol sulfate HFA (PROVENTIL HFA) 108 (90 Base) MCG/ACT inhaler Inhale 2 puffs into the lungs every 6 hours as needed for Wheezing or Shortness of Breath With spacer (and mask if indicated). Thanks. 1 Inhaler 3    [DISCONTINUED] lisinopril (PRINIVIL;ZESTRIL) 10 MG tablet TAKE ONE TABLET BY MOUTH DAILY (Patient taking differently: 5 mg) 90 tablet 3    [DISCONTINUED] carvedilol (COREG) 25 MG tablet TAKE ONE TABLET BY MOUTH TWICE A  tablet 3    [DISCONTINUED] isosorbide mononitrate (IMDUR) 30 MG extended release tablet Take 0.5 tablets by mouth daily (Patient not taking: Reported on 11/10/2022) 30 tablet 3    [DISCONTINUED] aspirin 81 MG tablet Take 81 mg by mouth daily (Patient not taking: Reported on 11/10/2022)      [DISCONTINUED] budesonide-formoterol (SYMBICORT) 160-4.5 MCG/ACT AERO Inhale 2 puffs into the lungs 2 times daily. 1 Inhaler 5     No facility-administered encounter medications on file as of 11/10/2022. Lab Data:  Lipids    CMP    CBC      IMAGING:   EKG  11.10.22 NSR Old septal infarction. Nonspecific T wave inversion in lateral leads  PVC isolated   Echo at [facility] 11/1/2022  Study Conclusions     - Left ventricle: The cavity size is normal. Wall thickness is     normal. Systolic function was severely reduced. The calculated     ejection fraction was 30%. Basal and mid Inferior and     inferolateral akinesis. Other segments display severe diffuse     hypokinesis. Doppler parameters are consistent with abnormal left     ventricular relaxation (grade 1 diastolic dysfunction). The     stroke volume is 65ml. The stroke index is 33ml/m^2. - Aortic valve: The peak systolic gradient is 5mm Hg. - Left atrium: The atrium is mildly dilated. - Inferior vena cava: The vessel was normal in size; the     respirophasic diameter changes were in the normal range (&gt;= 50%);     findings are consistent with normal central venous pressure. Device Check 10/10/2022  normal sensing and pacing function. Noted NSVT. Pt is on Coreg    Device check 12/10/19  Carelink transmission shows normal sensing and pacing function. No new arrhythmias    Device check 6/11/19  Carelink transmission shows normal sensing and pacing function. Thoracic impedance trend stable. No new arrhythmias. AP 9.6%  <0.1%    Device check   6/12/18  Medtronic normal sensing and pacing function. device check 8/29/17  Carelink transmission shows normal sensing and pacing function. EKG 5/19/2017  Normal sinus rhythm   Normal ECG     Limited ECHO 3/10/17  Summary   Limited echo for LV function with limited doppler/color. No echo for   comparison. The left ventricular systolic function is moderately reduced with an   ejection fraction of 35-40%. There is severe hypokinesis of the apical   septal, basal inferoseptal, basal inferior and the entire inferolateral   walls. The apex is akinetic. LV size is upper limits of normal. Grade 1   diastolic dysfunction with normal filling pressures. CATH 10/13/16  Cath EF Estimated 30 %   Patent LIMA to Diagonal.   Two occluded SVG. Extensive collateral formation from LAD system to RCA. Moderate global hypokinesis with lateral wall akinesis. Echo 10/15/15 [facility]  Summary   Limited echo for LV function with limited doppler/color. No echo for   comparison. The left ventricular systolic function is moderately reduced with an   ejection fraction of 35-40%. There is severe hypokinesis of the apical   septal, basal inferoseptal, basal inferior and the entire inferolateral   walls.  The apex is akinetic. LV size is upper limits of normal. Grade 1   diastolic dysfunction with normal filling pressures. Assessment:  Mat Muro was seen today for new patient, hyperlipidemia, hypertension, coronary artery disease, fatigue, shortness of breath, cardiomyopathy, chest pain, palpitations and edema. Diagnoses and all orders for this visit:    Irregular heart rate  -     EKG 12 lead    Essential hypertension  -     carvedilol (COREG) 25 MG tablet; TAKE ONE TABLET BY MOUTH TWICE A DAY    ICD (implantable cardioverter-defibrillator) 11/11/16    Ischemic cardiomyopathy    Primary hypertension    Pure hypercholesterolemia    Coronary artery disease involving native coronary artery of native heart with unstable angina pectoris ([facility] [address]    Other orders  -     isosorbide mononitrate (IMDUR) 30 MG extended release tablet; Take 0.5 tablets by mouth daily  -     eplerenone (INSPRA) 25 MG tablet; Take 1 tablet by mouth daily  -     aspirin EC 81 MG EC tablet; Take 1 tablet by mouth daily  -     sacubitril-valsartan (ENTRESTO) 24-26 MG per tablet; Take 1 tablet by mouth 2 times daily   Continue Coreg for arrhythmia and low EF  Device check regularly  BP is controlled  He is allergic to statin  LDL is high consider PCSK 9 or zetia   Patient has this chronic pain but hard to evaluate what is new  His EF has dropped and he has stopped taking imdur and asa on his own  add Entresto  Left hip hurts back hurts even with walking      Plan:  Labs reviewed in care everywhere  Current medications reviewed. Refills given as warranted. 1. Restart taking Asprin 81mg daily    2. Start Inspra 25mg daily. 3. Restart taking your Imdur 15 mg daily. 4. Start Entresto 24-26mg daily (if this is not covered by insurance restart  lisinopril)  Follow up with me in 6 months. If he would follow up regularly I can optimize his medical care. QUALITY MEASURES  1. Tobacco Cessation Counseling: NA  2. Retake of BP if >140/90:   NA  3.  Documentation to PCP/referring for new patient:  Sent to PCP at close of office visit  4. CAD patient on anti-platelet: Yes  5. CAD patient on STATIN therapy:  No unable to tolerate statin  6.  Patient with CHF and aFib on anticoagulation:  JIGNESH Barreto MD, MD 11/10/2022 4:56 PM

## 2022-11-10 NOTE — PATIENT INSTRUCTIONS
Plan:  Labs reviewed in care everywhere  Current medications reviewed. Refills given as warranted. 1. Restart taking Asprin 81mg daily    2. Start Inspra 25mg daily. 3. Restart taking your Imdur 15 mg daily.   4. Start Entresto 24-26mg daily (if this is not covered by insurance restart  lisinopril)  Follow up with me in 6 months

## 2022-11-11 ENCOUNTER — TELEPHONE (OUTPATIENT)
Dept: CARDIOLOGY CLINIC | Age: 47
End: 2022-11-11

## 2022-11-11 NOTE — TELEPHONE ENCOUNTER
Pt saw Jaja Schwab in office yesterday, stated he has some general medical questions regarding his echo and is requesting a call from medical staff. Please advise.

## 2022-11-16 ENCOUNTER — TELEPHONE (OUTPATIENT)
Dept: CARDIOLOGY CLINIC | Age: 47
End: 2022-11-16

## 2022-11-18 NOTE — TELEPHONE ENCOUNTER
Tell him to hold inspra for now and make sure [facility] gets message and he can decide what he wants to do going forward. Thanks.

## 2022-11-18 NOTE — TELEPHONE ENCOUNTER
Pt states his bedside monitor is not working properly. Last upload was 10/10/22. He states that the screen says [employer] and the screen is constantly lit up. Can you help please?

## 2022-11-18 NOTE — TELEPHONE ENCOUNTER
Spoke with pt and he states that his heart has been extra jumpy and he hasn't felt well since he started taking Inspra. He did not take med yesterday or today and thinks he feels better. What would you like to do?

## 2022-11-21 RX ORDER — LISINOPRIL 10 MG/1
10 TABLET ORAL DAILY
Qty: 90 TABLET | Refills: 1 | Status: SHIPPED | OUTPATIENT
Start: 2022-11-21

## 2022-11-21 NOTE — TELEPHONE ENCOUNTER
Spoke to pt, relayed message and he is concerned given his EF%. Pt sts that he is NOT taking Entresto. Last OV you stated that he would restart Lisinopril if Entresto was not covered. Pt sts that he is trying to apply for pt assistance for Entresto. Please send Lisinopril to [employer]. Thank you    Plan:  Labs reviewed in care everywhere  Current medications reviewed. Refills given as warranted. 1. Restart taking Asprin 81mg daily               2. Start Inspra 25mg daily. 3. Restart taking your Imdur 15 mg daily. 4. Start Entresto 24-26mg daily (if this is not covered by insurance restart  lisinopril)  Follow up with me in 6 months. If he would follow up regularly I can optimize his medical care.

## 2023-01-09 ENCOUNTER — NURSE ONLY (OUTPATIENT)
Dept: CARDIOLOGY CLINIC | Age: 48
End: 2023-01-09
Payer: MEDICAID

## 2023-01-09 DIAGNOSIS — Z95.810 ICD (IMPLANTABLE CARDIOVERTER-DEFIBRILLATOR) IN PLACE: ICD-10-CM

## 2023-01-09 DIAGNOSIS — I25.5 ISCHEMIC CARDIOMYOPATHY: Primary | ICD-10-CM

## 2023-01-09 PROCEDURE — 93295 DEV INTERROG REMOTE 1/2/MLT: CPT | Performed by: INTERNAL MEDICINE

## 2023-01-09 PROCEDURE — 93296 REM INTERROG EVL PM/IDS: CPT | Performed by: INTERNAL MEDICINE

## 2023-01-23 NOTE — PROGRESS NOTES
End of 91-day monitoring period 1/9. No  arrhythmias recorded. Remote transmission received for patients single chamber ICD. Transmission shows normal sensing and pacing function. EP physician will review. See interrogation under the cardiology tab in the [address] [place] [address] [address] field for more details. Will continue to monitor remotely. Possible OptiVol fluid accumulation: 01-Jan-2023 -- ongoing.-no dx of chf noted.

## 2023-01-24 ENCOUNTER — TELEPHONE (OUTPATIENT)
Dept: CARDIOLOGY CLINIC | Age: 48
End: 2023-01-24

## 2023-01-24 NOTE — TELEPHONE ENCOUNTER
Pt stated that his bed side transmitter needed to be updated and that he just received the new transmitter. Pt was advised to send a transmission once he sets it up. Pt would like to know if we can check the battery life of his device. Please advise.

## 2023-01-24 NOTE — TELEPHONE ENCOUNTER
Last measurement on 1/9 shows 3.7 years left on the battery of his ICD. Where Do You Want The Question To Include Opioid Counseling Located?: Case Summary Tab

## 2023-03-06 NOTE — PROGRESS NOTES
[facility]   Office Note  3/7/2023     Subjective:  Mr. Elidia Corral presents for follow up for CAD with CABG and dual chamber ICD management. [place]:    At last OV 11/10/22, patient reported he is here today due to swollen legs and SOB which he has been experiencing on and off for 2 years. His PCP recommended an echocardiogram which was performed 1/1/2022. He states he takes 2-3 nitros per week for chest pain. This is chronic pattern. He reported he had having difficulty getting a job because he cannot retain information. His PCP has recommends disability allowance due to his health care issues. He reported he was recently diagnosed with diabetes. He noted he stopped taking Asprin and Imdur 2 years ago. He takes his Lisinopril. He is non compliant with follow up in office. Today 3/7/23, patient reports he still takes nitro 1-3 times weekly for chest pain. He states Imdur gives him headaches therefore he has only been taking half tablet. He reports he now takes Lasix 20 mg twice daily for lower leg extremity edema. Patient denies current sob, palpitations, dizziness or syncope. PMH:  Artem Del Valle is a 52 y.o. male who presents for evaluation of CAD with CABG in 2007. He had cardiac arrest on 10/13/16 due to arrhythmia while at work. He underwent repeat cardiac cath on 10/13/16 which showed patent LIMA to diagonal, two occluded SVG, extensive collateral formation from LAD system to RCA. Moderate global hypokinesis with lateral wall akinesis. His EF was 30-35%. He [facility] on [facility]. At last OV 1/12/2020 he stated he does not like taking isordil due to splitting headache and he is afraid of stress test since he collapsed on the first one before his CABG.     Review of Systems:  12 point ROS negative in all areas as listed below except as in [place]  Constitutional, EENT, pulmonary, GI, , Musculoskeletal, skin, neurological, hematological, endocrine, Psychiatric    Reviewed past medical history, social, and family history. Smoker:nonsmoker  Alcohol:no alcohol  Mother No heart disease  Father heart attacks. CVA  Past Medical History:   Diagnosis Date    Anxious mood     Fatigue     Gouty arthritis     Hyperlipidemia     Hypertension     Hypothyroidism     Other (abnormal) findings on radiological examination of breast     difibulator placment end of Oct 2016, Dr. Alston Both    Sexual dysfunction      Past Surgical History:   Procedure Laterality Date    CARDIAC DEFIBRILLATOR PLACEMENT         Objective:   /88 (Site: Left Upper Arm, Cuff Size: Medium Adult)   Pulse 74   Ht 5' 8\" (1.727 m)   Wt 174 lb (78.9 kg)   SpO2 100%   BMI 26.46 kg/m²     Wt Readings from Last 3 Encounters:   03/07/23 174 lb (78.9 kg)   11/10/22 183 lb (83 kg)   11/08/22 178 lb (80.7 kg)       Physical Exam:  General: No Respiratory distress, appears well developed and well nourished. Eyes:  Sclera nonicteric  Nose/Sinuses:  negative findings: nose shows no deformity, asymmetry, or inflammation, nasal mucosa normal, septum midline with no perforation or bleeding  Back:  no pain to palpation  Joint:  no active joint inflammation  Musculoskeletal:  negative  Skin:  Warm and dry  Neck:  Negative for JVD and Carotid Bruits. Chest:  Clear to auscultation, respiration easy  Cardiovascular:  RRR, S1S2 normal, no murmur, no rub or thrill.   Abdomen:  Soft normal liver and spleen  Extremities:  1+ edema, clubbing, cyanosis,  Pulses:  pedal pulses are normal.  Neuro: intact    Medications:   Outpatient Encounter Medications as of 3/7/2023   Medication Sig Dispense Refill    lisinopril (PRINIVIL;ZESTRIL) 10 MG tablet Take 1 tablet by mouth daily 90 tablet 1    metFORMIN (GLUCOPHAGE-XR) 500 MG extended release tablet Take 500 mg by mouth every morning      predniSONE (DELTASONE) 20 MG tablet Take 20 mg by mouth daily      isosorbide mononitrate (IMDUR) 30 MG extended release tablet Take 0.5 tablets by mouth daily 90 tablet 1    aspirin EC 81 MG EC tablet Take 1 tablet by mouth daily 90 tablet 1    carvedilol (COREG) 25 MG tablet TAKE ONE TABLET BY MOUTH TWICE A  tablet 3    nitroGLYCERIN (NITROSTAT) 0.4 MG SL tablet DISSOLVE 1 TAB UNDER TONGUE FOR CHEST PAIN - IF PAIN REMAINS AFTER 5 MIN, CALL 911 AND REPEAT DOSE. MAX 3 TABS IN 15 MINUTES 25 tablet 3    levothyroxine (SYNTHROID) 125 MCG tablet TAKE ONE TABLET BY MOUTH DAILY 90 tablet 3    gabapentin (NEURONTIN) 600 MG tablet Take 600 mg by mouth in the morning and at bedtime. albuterol sulfate HFA (PROVENTIL HFA) 108 (90 Base) MCG/ACT inhaler Inhale 2 puffs into the lungs every 6 hours as needed for Wheezing or Shortness of Breath With spacer (and mask if indicated). Thanks. 1 Inhaler 3    [DISCONTINUED] budesonide-formoterol (SYMBICORT) 160-4.5 MCG/ACT AERO Inhale 2 puffs into the lungs 2 times daily. 1 Inhaler 5     No facility-administered encounter medications on file as of 3/7/2023. Lab Data:  Lipids    CMP    CBC      IMAGING:     Device Check 1/9/23  Transmission shows normal sensing and pacing function. No arrhythmias recorded. EKG  11.10.22   NSR Old septal infarction. Nonspecific T wave inversion in lateral leads  PVC isolated     Echo at [facility] 11/1/2022  Study Conclusions     - Left ventricle: The cavity size is normal. Wall thickness is     normal. Systolic function was severely reduced. The calculated     ejection fraction was 30%. Basal and mid Inferior and     inferolateral akinesis. Other segments display severe diffuse     hypokinesis. Doppler parameters are consistent with abnormal left     ventricular relaxation (grade 1 diastolic dysfunction). The     stroke volume is 65ml. The stroke index is 33ml/m^2. - Aortic valve: The peak systolic gradient is 5mm Hg. - Left atrium: The atrium is mildly dilated. - Inferior vena cava:  The vessel was normal in size; the     respirophasic diameter changes were in the normal range (&gt;= 50%);     findings are consistent with normal central venous pressure. Device Check 10/10/2022  normal sensing and pacing function. Noted NSVT. Pt is on Coreg    Device check 12/10/19  Carelink transmission shows normal sensing and pacing function. No new arrhythmias    Device check 6/11/19  Carelink transmission shows normal sensing and pacing function. Thoracic impedance trend stable. No new arrhythmias. AP 9.6%  <0.1%    Device check   6/12/18  [employer]tr[employer] normal sensing and pacing function. device check 8/29/17  Carelink transmission shows normal sensing and pacing function. EKG 5/19/2017  Normal sinus rhythm   Normal ECG     Limited ECHO 3/10/17  Summary   Limited echo for LV function with limited doppler/color. No echo for   comparison. The left ventricular systolic function is moderately reduced with an   ejection fraction of 35-40%. There is severe hypokinesis of the apical   septal, basal inferoseptal, basal inferior and the entire inferolateral   walls. The apex is akinetic. LV size is upper limits of normal. Grade 1   diastolic dysfunction with normal filling pressures. CATH 10/13/16  Cath EF Estimated 30 %   Patent LIMA to Diagonal.   Two occluded SVG. Extensive collateral formation from LAD system to RCA. Moderate global hypokinesis with lateral wall akinesis. Echo 10/15/15 [facility]  Summary   Limited echo for LV function with limited doppler/color. No echo for   comparison. The left ventricular systolic function is moderately reduced with an   ejection fraction of 35-40%. There is severe hypokinesis of the apical   septal, basal inferoseptal, basal inferior and the entire inferolateral   walls. The apex is akinetic. LV size is upper limits of normal. Grade 1   diastolic dysfunction with normal filling pressures. Assessment:dx  Encounter Diagnoses   Name Primary?     Coronary artery disease of native artery of native heart with stable angina pectoris ([facility] [address] Yes    Primary hypertension     Pure hypercholesterolemia     Ischemic cardiomyopathy     ICD (implantable cardioverter-defibrillator) in place    Angina 3 times a week  he is taking  carvedilol asa  Imdure and prn nitro  Recommend lexiscan with hope to find area that can be revascularized. He is reluctant to do stress test.   Continue Coreg for arrhythmia and low EF  Device check regularly last check 1.9.23 no arrhythmia   BP is controlled continue beta blockers and lisinopril  He is allergic to statin  LDL is high consider PCSK 9 or zetia   Patient has this chronic pain but hard to evaluate what is new  Unable to afford Entresto he is on lisinopril   Left hip hurts back hurts even with walking on long term po prednisone      Plan:  Current medications reviewed. No changes at this time. Refills given as warranted. Try Imdur 15mg in morning and 15 mg at night. He state she had headaches   I recommend you start Zetia for high cholesterol. Discussed nuclear stress test for chest pain. Patient going to think about the procedure and get back to us if he would like to go ahead   Follow up with me in 6 months         This note has been scribed in the presence of Dr Terri Medina MD, by Jerson Gaxiola RN.     I, Dr. Terri Medina, personally performed the services described in this documentation, as scribed by the above signed scribe in my presence. It is both accurate and complete to my knowledge. I agree with the details independently gathered by the clinical support staff, while the remaining scribed note accurately describes my personal service to the patient. QUALITY MEASURES  1. Tobacco Cessation Counseling: NA  2. Retake of BP if >140/90:   NA  3. Documentation to PCP/referring for new patient:  Sent to PCP at close of office visit  4. CAD patient on anti-platelet: Yes  5. CAD patient on STATIN therapy:  No unable to tolerate statin  6.  Patient with CHF and aFib on anticoagulation:  NA     Ana Munoz MD  [place] [address]  [phone] [facility]  [phone] [address]

## 2023-03-07 ENCOUNTER — OFFICE VISIT (OUTPATIENT)
Dept: CARDIOLOGY CLINIC | Age: 48
End: 2023-03-07
Payer: MEDICAID

## 2023-03-07 VITALS
OXYGEN SATURATION: 100 % | DIASTOLIC BLOOD PRESSURE: 88 MMHG | BODY MASS INDEX: 26.37 KG/M2 | HEIGHT: 68 IN | SYSTOLIC BLOOD PRESSURE: 132 MMHG | HEART RATE: 74 BPM | WEIGHT: 174 LBS

## 2023-03-07 DIAGNOSIS — I10 PRIMARY HYPERTENSION: ICD-10-CM

## 2023-03-07 DIAGNOSIS — Z95.810 ICD (IMPLANTABLE CARDIOVERTER-DEFIBRILLATOR) IN PLACE: ICD-10-CM

## 2023-03-07 DIAGNOSIS — I25.118 CORONARY ARTERY DISEASE OF NATIVE ARTERY OF NATIVE HEART WITH STABLE ANGINA PECTORIS (HCC): Primary | ICD-10-CM

## 2023-03-07 DIAGNOSIS — I25.5 ISCHEMIC CARDIOMYOPATHY: ICD-10-CM

## 2023-03-07 DIAGNOSIS — E78.00 PURE HYPERCHOLESTEROLEMIA: ICD-10-CM

## 2023-03-07 PROCEDURE — 3079F DIAST BP 80-89 MM HG: CPT | Performed by: INTERNAL MEDICINE

## 2023-03-07 PROCEDURE — 99214 OFFICE O/P EST MOD 30 MIN: CPT | Performed by: INTERNAL MEDICINE

## 2023-03-07 PROCEDURE — 3075F SYST BP GE 130 - 139MM HG: CPT | Performed by: INTERNAL MEDICINE

## 2023-03-07 RX ORDER — FUROSEMIDE 20 MG/1
20 TABLET ORAL 2 TIMES DAILY
COMMUNITY
Start: 2023-01-10

## 2023-03-07 RX ORDER — EZETIMIBE 10 MG/1
10 TABLET ORAL DAILY
Qty: 90 TABLET | Refills: 1 | Status: SHIPPED | OUTPATIENT
Start: 2023-03-07

## 2023-03-07 NOTE — PATIENT INSTRUCTIONS
Plan:  Current medications reviewed. No changes at this time. Refills given as warranted. Try Imdur 15mg in morning and 15 mg at night. He state she had headaches   I recommend you start Zetia for high cholesterol. Discussed nuclear stress test for chest pain.  Patient going to think about the procedure and get back to us if he would like to go ahead   Follow up with me in 6 months

## 2023-07-10 ENCOUNTER — TELEPHONE (OUTPATIENT)
Dept: CARDIOLOGY CLINIC | Age: 48
End: 2023-07-10

## 2023-07-10 NOTE — TELEPHONE ENCOUNTER
CARDIAC CLEARANCE REQUEST    What type of procedure are you having:Epidural Steroid Injection    Are you taking any blood thinners: Advil    Type on anesthesia:unknown    When is your procedure scheduled for:not scheduled yet    What physician is performing your procedure: 01 [facility]    Fax number to send the letter: [phone]    Yasmine Stiles wants to know if pt can hold Advil six days before Injection

## 2023-09-07 RX ORDER — EZETIMIBE 10 MG/1
TABLET ORAL
Qty: 90 TABLET | Refills: 0 | Status: SHIPPED | OUTPATIENT
Start: 2023-09-07

## 2023-09-07 NOTE — TELEPHONE ENCOUNTER
LOV- 3/7/23  UOV- 9/19/23    LIPID- 9/30/22 care everywhere  LFT- 9/30/22 care everywhere    [facility]

## 2023-09-12 ENCOUNTER — TELEPHONE (OUTPATIENT)
Dept: CARDIOLOGY CLINIC | Age: 48
End: 2023-09-12

## 2023-09-12 NOTE — TELEPHONE ENCOUNTER
CARDIAC CLEARANCE REQUEST    What type of procedure are you having:  Epidural steroid injection     Are you taking any blood thinners:yes    Type on anesthesia:no    When is your procedure scheduled for:TBD    What physician is performing your procedure:  Dr. Becky Colon    Fax number to send the letter: [phone]    Pt needs to hold Asprin for 6 days prior and day of procedure.

## 2023-09-12 NOTE — TELEPHONE ENCOUNTER
Intermediate risk clinically for lower risk procedure. Hold aspirin for least amount of time possible. Proceed as planned.

## 2023-09-19 ENCOUNTER — OFFICE VISIT (OUTPATIENT)
Dept: CARDIOLOGY CLINIC | Age: 48
End: 2023-09-19
Payer: COMMERCIAL

## 2023-09-19 VITALS
OXYGEN SATURATION: 97 % | HEIGHT: 68 IN | HEART RATE: 82 BPM | SYSTOLIC BLOOD PRESSURE: 116 MMHG | WEIGHT: 164 LBS | DIASTOLIC BLOOD PRESSURE: 62 MMHG | BODY MASS INDEX: 24.86 KG/M2

## 2023-09-19 DIAGNOSIS — I25.5 ISCHEMIC CARDIOMYOPATHY: Primary | ICD-10-CM

## 2023-09-19 DIAGNOSIS — E78.00 PURE HYPERCHOLESTEROLEMIA: ICD-10-CM

## 2023-09-19 DIAGNOSIS — I10 ESSENTIAL HYPERTENSION: ICD-10-CM

## 2023-09-19 DIAGNOSIS — I10 PRIMARY HYPERTENSION: ICD-10-CM

## 2023-09-19 DIAGNOSIS — Z95.810 ICD (IMPLANTABLE CARDIOVERTER-DEFIBRILLATOR) IN PLACE: ICD-10-CM

## 2023-09-19 DIAGNOSIS — F41.9 CHRONIC ANXIETY: ICD-10-CM

## 2023-09-19 DIAGNOSIS — Z78.9 STATIN INTOLERANCE: ICD-10-CM

## 2023-09-19 PROCEDURE — 3074F SYST BP LT 130 MM HG: CPT | Performed by: INTERNAL MEDICINE

## 2023-09-19 PROCEDURE — 3078F DIAST BP <80 MM HG: CPT | Performed by: INTERNAL MEDICINE

## 2023-09-19 PROCEDURE — 99214 OFFICE O/P EST MOD 30 MIN: CPT | Performed by: INTERNAL MEDICINE

## 2023-09-19 RX ORDER — CARVEDILOL 12.5 MG/1
12.5 TABLET ORAL 2 TIMES DAILY
Qty: 180 TABLET | Refills: 3 | Status: SHIPPED | OUTPATIENT
Start: 2023-09-19

## 2023-09-19 RX ORDER — SPIRONOLACTONE 25 MG/1
12.5 TABLET ORAL DAILY
Qty: 45 TABLET | Refills: 3 | Status: SHIPPED | OUTPATIENT
Start: 2023-09-19

## 2023-09-19 RX ORDER — LISINOPRIL 5 MG/1
5 TABLET ORAL DAILY
Qty: 90 TABLET | Refills: 3 | Status: SHIPPED | OUTPATIENT
Start: 2023-09-19

## 2023-09-19 NOTE — PATIENT INSTRUCTIONS
Plan:  Current medications reviewed. Refills given as warranted. I recommend you start spironolactone 12 mg daily   Discussed Repatha. Patient would like to think about this. Will repeat fasting lipid in 3 months. Continue to encourage heart healthy, low sodium diet and regular physical exercise for at least 30 minutes a minimum of 3-5 times per week. Device check today.    Repeat fasting Lipid in 3 months   Follow up with me in 6 months

## 2023-09-19 NOTE — PROGRESS NOTES
shows normal sensing and pacing function. No arrhythmias recorded. EKG  11.10.22   NSR Old septal infarction. Nonspecific T wave inversion in lateral leads  PVC isolated     Echo at [facility] 11/1/2022  Study Conclusions     - Left ventricle: The cavity size is normal. Wall thickness is     normal. Systolic function was severely reduced. The calculated     ejection fraction was 30%. Basal and mid Inferior and     inferolateral akinesis. Other segments display severe diffuse     hypokinesis. Doppler parameters are consistent with abnormal left     ventricular relaxation (grade 1 diastolic dysfunction). The     stroke volume is 65ml. The stroke index is 33ml/m^2. - Aortic valve: The peak systolic gradient is 5mm Hg. - Left atrium: The atrium is mildly dilated. - Inferior vena cava: The vessel was normal in size; the     respirophasic diameter changes were in the normal range (&gt;= 50%);     findings are consistent with normal central venous pressure. Device Check 10/10/2022  normal sensing and pacing function. Noted NSVT. Pt is on Coreg    Device check 12/10/19  Carelink transmission shows normal sensing and pacing function. No new arrhythmias    Device check 6/11/19  Carelink transmission shows normal sensing and pacing function. Thoracic impedance trend stable. No new arrhythmias. AP 9.6%  <0.1%    Device check   6/12/18  [employer] normal sensing and pacing function. device check 8/29/17  Carelink transmission shows normal sensing and pacing function. EKG 5/19/2017  Normal sinus rhythm   Normal ECG     Limited ECHO 3/10/17  Summary   Limited echo for LV function with limited doppler/color. No echo for   comparison. The left ventricular systolic function is moderately reduced with an   ejection fraction of 35-40%. There is severe hypokinesis of the apical   septal, basal inferoseptal, basal inferior and the entire inferolateral   walls. The apex is akinetic.  LV size is upper limits

## 2023-10-20 ENCOUNTER — TELEPHONE (OUTPATIENT)
Dept: CARDIOLOGY CLINIC | Age: 48
End: 2023-10-20

## 2023-10-20 ENCOUNTER — HOSPITAL ENCOUNTER (EMERGENCY)
Age: 48
Discharge: LEFT BEFORE DISCHARGE | End: 2023-10-20
Attending: STUDENT IN AN ORGANIZED HEALTH CARE EDUCATION/TRAINING PROGRAM
Payer: COMMERCIAL

## 2023-10-20 ENCOUNTER — APPOINTMENT (OUTPATIENT)
Dept: CT IMAGING | Age: 48
End: 2023-10-20
Payer: COMMERCIAL

## 2023-10-20 ENCOUNTER — APPOINTMENT (OUTPATIENT)
Dept: GENERAL RADIOLOGY | Age: 48
End: 2023-10-20
Payer: COMMERCIAL

## 2023-10-20 VITALS
SYSTOLIC BLOOD PRESSURE: 135 MMHG | TEMPERATURE: 99.1 F | HEART RATE: 66 BPM | OXYGEN SATURATION: 98 % | WEIGHT: 164 LBS | RESPIRATION RATE: 11 BRPM | BODY MASS INDEX: 24.86 KG/M2 | HEIGHT: 68 IN | DIASTOLIC BLOOD PRESSURE: 78 MMHG

## 2023-10-20 DIAGNOSIS — R07.9 CHEST PAIN, UNSPECIFIED TYPE: Primary | ICD-10-CM

## 2023-10-20 DIAGNOSIS — Z45.02 AICD DISCHARGE: ICD-10-CM

## 2023-10-20 DIAGNOSIS — I49.01 VENTRICULAR FIBRILLATION (HCC): ICD-10-CM

## 2023-10-20 LAB
ALBUMIN SERPL-MCNC: 3.8 G/DL (ref 3.4–5)
ALBUMIN/GLOB SERPL: 1.3 {RATIO} (ref 1.1–2.2)
ALP SERPL-CCNC: 83 U/L (ref 40–129)
ALT SERPL-CCNC: 9 U/L (ref 10–40)
ANION GAP SERPL CALCULATED.3IONS-SCNC: 9 MMOL/L (ref 3–16)
AST SERPL-CCNC: 10 U/L (ref 15–37)
BASOPHILS # BLD: 0.1 K/UL (ref 0–0.2)
BASOPHILS NFR BLD: 0.5 %
BILIRUB SERPL-MCNC: 0.5 MG/DL (ref 0–1)
BUN SERPL-MCNC: 15 MG/DL (ref 7–20)
CALCIUM SERPL-MCNC: 9.2 MG/DL (ref 8.3–10.6)
CHLORIDE SERPL-SCNC: 99 MMOL/L (ref 99–110)
CO2 SERPL-SCNC: 23 MMOL/L (ref 21–32)
CREAT SERPL-MCNC: 0.8 MG/DL (ref 0.9–1.3)
DEPRECATED RDW RBC AUTO: 15.7 % (ref 12.4–15.4)
EKG ATRIAL RATE: 65 BPM
EKG DIAGNOSIS: NORMAL
EKG P AXIS: 12 DEGREES
EKG P-R INTERVAL: 168 MS
EKG Q-T INTERVAL: 416 MS
EKG QRS DURATION: 110 MS
EKG QTC CALCULATION (BAZETT): 432 MS
EKG R AXIS: 37 DEGREES
EKG T AXIS: 172 DEGREES
EKG VENTRICULAR RATE: 65 BPM
EOSINOPHIL # BLD: 0 K/UL (ref 0–0.6)
EOSINOPHIL NFR BLD: 0.3 %
GFR SERPLBLD CREATININE-BSD FMLA CKD-EPI: >60 ML/MIN/{1.73_M2}
GLUCOSE SERPL-MCNC: 206 MG/DL (ref 70–99)
HCT VFR BLD AUTO: 44.6 % (ref 40.5–52.5)
HGB BLD-MCNC: 14.9 G/DL (ref 13.5–17.5)
LYMPHOCYTES # BLD: 0.9 K/UL (ref 1–5.1)
LYMPHOCYTES NFR BLD: 6.9 %
MCH RBC QN AUTO: 29.4 PG (ref 26–34)
MCHC RBC AUTO-ENTMCNC: 33.4 G/DL (ref 31–36)
MCV RBC AUTO: 88 FL (ref 80–100)
MONOCYTES # BLD: 0.2 K/UL (ref 0–1.3)
MONOCYTES NFR BLD: 1.9 %
NEUTROPHILS # BLD: 11.2 K/UL (ref 1.7–7.7)
NEUTROPHILS NFR BLD: 90.4 %
PLATELET # BLD AUTO: 211 K/UL (ref 135–450)
PMV BLD AUTO: 7.9 FL (ref 5–10.5)
POTASSIUM SERPL-SCNC: 4.2 MMOL/L (ref 3.5–5.1)
PROT SERPL-MCNC: 6.7 G/DL (ref 6.4–8.2)
RBC # BLD AUTO: 5.07 M/UL (ref 4.2–5.9)
SODIUM SERPL-SCNC: 131 MMOL/L (ref 136–145)
TROPONIN, HIGH SENSITIVITY: 18 NG/L (ref 0–22)
WBC # BLD AUTO: 12.4 K/UL (ref 4–11)

## 2023-10-20 PROCEDURE — 93010 ELECTROCARDIOGRAM REPORT: CPT | Performed by: INTERNAL MEDICINE

## 2023-10-20 PROCEDURE — 84484 ASSAY OF TROPONIN QUANT: CPT

## 2023-10-20 PROCEDURE — 99285 EMERGENCY DEPT VISIT HI MDM: CPT

## 2023-10-20 PROCEDURE — 93005 ELECTROCARDIOGRAM TRACING: CPT | Performed by: STUDENT IN AN ORGANIZED HEALTH CARE EDUCATION/TRAINING PROGRAM

## 2023-10-20 PROCEDURE — 70450 CT HEAD/BRAIN W/O DYE: CPT

## 2023-10-20 PROCEDURE — 80053 COMPREHEN METABOLIC PANEL: CPT

## 2023-10-20 PROCEDURE — 71045 X-RAY EXAM CHEST 1 VIEW: CPT

## 2023-10-20 PROCEDURE — 85025 COMPLETE CBC W/AUTO DIFF WBC: CPT

## 2023-10-20 ASSESSMENT — PAIN SCALES - GENERAL: PAINLEVEL_OUTOF10: 4

## 2023-10-20 ASSESSMENT — PAIN - FUNCTIONAL ASSESSMENT: PAIN_FUNCTIONAL_ASSESSMENT: 0-10

## 2023-10-20 ASSESSMENT — PAIN DESCRIPTION - LOCATION: LOCATION: GENERALIZED

## 2023-10-20 NOTE — TELEPHONE ENCOUNTER
Spoke with patient himself. I relayed KXA message and he V/U. He is wondering if there is anything that can be done outpatient, as his son is getting  tomorrow and it is an all day event? He is hoping to avoid hospitalization. Roxie Drones  any recs other than ER?

## 2023-10-20 NOTE — ED PROVIDER NOTES
[address] [place] [address]  ED     EMERGENCY DEPARTMENT ENCOUNTER            Pt Name: Imer Monroe   MRN: [id number]   [facility] [facility] 1975   Date of evaluation: 10/20/2023   Provider: Roberto Morse MD   PCP: Murphy Haro MD   Note Started: 6:44 PM EDT 10/20/23          CHIEF COMPLAINT     Chief Complaint   Patient presents with    Chest Pain     Patient thought he had a syncopal episode this morning, states cardiologist was trying to contact him for hours. Has implanted defibrillator/pacemaker and it fired this morning because he went into cardiac arrest. He was told to come to the ED immediately for further evaluation. HISTORY OF PRESENT ILLNESS:   History from : Patient   Limitations to history : None     Imer Monroe is a 50 y.o. male who presents complaining of syncopal episode, AICD firing. Patient states that earlier today, he was sitting up and found himself on the floor. He had some left-sided chest pain at that time. Unsure if he hit his head. States that he was on the phone at the time and was out for approximately 30 to 40 seconds. He states that he was called by his cardiologist earlier today and was informed that he had been shocked by his AICD. He was advised to come to the ER for further evaluation. He denies any other complaints or concerns. Nursing Notes were all reviewed and agreed with, or any disagreements were addressed in the HPI. REVIEW OF SYSTEMS :    Positives and Pertinent negatives as per HPI. MEDICAL HISTORY   has a past medical history of Anxious mood, Fatigue, Gouty arthritis, Hyperlipidemia, Hypertension, Hypothyroidism, Other (abnormal) findings on radiological examination of breast, and Sexual dysfunction.     Past Surgical History:   Procedure Laterality Date    CARDIAC DEFIBRILLATOR PLACEMENT        CURRENTMEDICATIONS       Previous Medications    ALBUTEROL SULFATE HFA (PROVENTIL HFA) 108 (90 BASE) MCG/ACT INHALER    Inhale 2 puffs into

## 2023-10-20 NOTE — TELEPHONE ENCOUNTER
Briana Jeter MD  to Me        10/20/23  1:55 PM   Please call patient and ask him to go to ED. He has an ICD shock for very rapid VF today   Briana Jeter MD  to Me        10/20/23  1:56 PM   Note from Erick Espinosa about that patient         Attempted to reach patient. Straight to  and  full. Also attempted to reach his emergency contact, no answer.

## 2023-10-20 NOTE — TELEPHONE ENCOUNTER
Spoke with VANDANA. ER is necessary at this point. Spoke with patient and relayed message.  He V/U. [FreeTextEntry2] : Name: GARFIELD VILLAGOMEZ \par : 1971 \par \par Date of Visit: Oct 20, 2020 \par \par Dear Dr. Long\par \par I recently saw our mutual patient, GARFIELD VILLAGOMEZ , in my office.\par \par Below, please see my findings.\par \par As always, it is my sincere pleasure to have the opportunity to participate in one of your patients' care. Please feel free to contact me with any questions or concerns that you may have regarding her care.\par \par Sincerely yours,\par \par Liyah Raymundo MD\par

## 2023-10-23 ENCOUNTER — TELEPHONE (OUTPATIENT)
Dept: CARDIOLOGY CLINIC | Age: 48
End: 2023-10-23

## 2023-10-23 NOTE — TELEPHONE ENCOUNTER
Pt states he has questions about his device. Pt would like to know about what kind of device he has, what it does , and more. Pt would like to speak with someone regarding these questions. Please advise.

## 2023-10-24 NOTE — TELEPHONE ENCOUNTER
Spoke with patient. Explained in detail what type of device patient has and its functinoality. Patient was seen in the ED on 10/20 - he was shocked for VF. Patient left ED AMA d/t son getting  the next day. He has not been seen by EP, only MELANY. Spoke w/ RNEW, per RN patient can be seen as a new patient w/ VANDANA 10/30 @ [phone].      Called patient and gave appointment time, he agrees - v/u

## 2023-10-25 ENCOUNTER — TELEPHONE (OUTPATIENT)
Dept: CARDIOLOGY CLINIC | Age: 48
End: 2023-10-25

## 2023-10-25 NOTE — TELEPHONE ENCOUNTER
----- Message from Laurie Hines MD sent at 10/25/2023 11:56 AM EDT -----  Please help me make sure patient has follow up with Dr. Tulio House and follow up with STEVE.   Thanks.  ----- Message -----  From: Tyrel Barrera MD  Sent: 10/20/2023   9:10 PM EDT  To: Laurie Hines MD

## 2023-10-30 ENCOUNTER — NURSE ONLY (OUTPATIENT)
Dept: CARDIOLOGY CLINIC | Age: 48
End: 2023-10-30
Payer: COMMERCIAL

## 2023-10-30 ENCOUNTER — TELEPHONE (OUTPATIENT)
Dept: CARDIOLOGY CLINIC | Age: 48
End: 2023-10-30

## 2023-10-30 ENCOUNTER — OFFICE VISIT (OUTPATIENT)
Dept: CARDIOLOGY CLINIC | Age: 48
End: 2023-10-30
Payer: COMMERCIAL

## 2023-10-30 VITALS
HEART RATE: 73 BPM | WEIGHT: 168 LBS | SYSTOLIC BLOOD PRESSURE: 130 MMHG | DIASTOLIC BLOOD PRESSURE: 80 MMHG | OXYGEN SATURATION: 97 % | HEIGHT: 68 IN | BODY MASS INDEX: 25.46 KG/M2

## 2023-10-30 DIAGNOSIS — R55 SYNCOPE AND COLLAPSE: ICD-10-CM

## 2023-10-30 DIAGNOSIS — Z95.810 ICD (IMPLANTABLE CARDIOVERTER-DEFIBRILLATOR) IN PLACE: Primary | ICD-10-CM

## 2023-10-30 DIAGNOSIS — I25.5 ISCHEMIC CARDIOMYOPATHY: ICD-10-CM

## 2023-10-30 DIAGNOSIS — I49.9 IRREGULAR HEART RATE: ICD-10-CM

## 2023-10-30 DIAGNOSIS — Z95.810 ICD (IMPLANTABLE CARDIOVERTER-DEFIBRILLATOR) IN PLACE: ICD-10-CM

## 2023-10-30 DIAGNOSIS — I49.01 VENTRICULAR FIBRILLATION (HCC): Primary | ICD-10-CM

## 2023-10-30 PROCEDURE — 93283 PRGRMG EVAL IMPLANTABLE DFB: CPT | Performed by: INTERNAL MEDICINE

## 2023-10-30 PROCEDURE — 99204 OFFICE O/P NEW MOD 45 MIN: CPT | Performed by: INTERNAL MEDICINE

## 2023-10-30 PROCEDURE — 3075F SYST BP GE 130 - 139MM HG: CPT | Performed by: INTERNAL MEDICINE

## 2023-10-30 PROCEDURE — 3079F DIAST BP 80-89 MM HG: CPT | Performed by: INTERNAL MEDICINE

## 2023-10-30 RX ORDER — FAMOTIDINE 20 MG/1
1 TABLET, FILM COATED ORAL 2 TIMES DAILY
COMMUNITY
Start: 2023-06-05

## 2023-10-30 RX ORDER — BUSPIRONE HYDROCHLORIDE 10 MG/1
TABLET ORAL
COMMUNITY
Start: 2023-10-13

## 2023-10-30 ASSESSMENT — ENCOUNTER SYMPTOMS
RIGHT EYE: 0
SHORTNESS OF BREATH: 0
STRIDOR: 0
LEFT EYE: 0
WHEEZING: 0
HEMATOCHEZIA: 0
HEMATEMESIS: 0

## 2023-10-30 NOTE — PATIENT INSTRUCTIONS
Plan: STAT stress test to assess for ischemia. Avoid driving for one month. Remote device checks every three months. Continue taking current cardiac medications as prescribed. Follow up with Dr. Tiffanie Goldberg in 1 month. Follow up with me in 3 months. Your provider has ordered testing for further evaluation. An order/prescription has been included in your paper work. To schedule outpatient testing, contact Central Scheduling by calling [phone] ([phone]).

## 2023-10-30 NOTE — TELEPHONE ENCOUNTER
Pt saw Edmund Jeanne today and was informed to see RKG in 1 month. Can  use 11/28/23 at 245pm with [phone] at [facility] [place]?  I informed pt we will call him back

## 2023-11-28 ENCOUNTER — OFFICE VISIT (OUTPATIENT)
Dept: CARDIOLOGY CLINIC | Age: 48
End: 2023-11-28
Payer: COMMERCIAL

## 2023-11-28 VITALS
BODY MASS INDEX: 25.16 KG/M2 | WEIGHT: 166 LBS | SYSTOLIC BLOOD PRESSURE: 128 MMHG | DIASTOLIC BLOOD PRESSURE: 76 MMHG | HEIGHT: 68 IN | OXYGEN SATURATION: 97 % | HEART RATE: 57 BPM

## 2023-11-28 DIAGNOSIS — I49.9 CARDIAC ARRHYTHMIA, UNSPECIFIED CARDIAC ARRHYTHMIA TYPE: ICD-10-CM

## 2023-11-28 DIAGNOSIS — E78.00 HYPERCHOLESTEROLEMIA: ICD-10-CM

## 2023-11-28 DIAGNOSIS — Z78.9 STATIN INTOLERANCE: ICD-10-CM

## 2023-11-28 DIAGNOSIS — I25.5 ISCHEMIC CARDIOMYOPATHY: ICD-10-CM

## 2023-11-28 DIAGNOSIS — I25.10 CORONARY ARTERY DISEASE INVOLVING NATIVE CORONARY ARTERY OF NATIVE HEART WITHOUT ANGINA PECTORIS: ICD-10-CM

## 2023-11-28 DIAGNOSIS — Z45.02 IMPLANTABLE CARDIOVERTER-DEFIBRILLATOR (ICD) DISCHARGE: Primary | ICD-10-CM

## 2023-11-28 PROCEDURE — 99214 OFFICE O/P EST MOD 30 MIN: CPT | Performed by: INTERNAL MEDICINE

## 2023-11-28 PROCEDURE — 3078F DIAST BP <80 MM HG: CPT | Performed by: INTERNAL MEDICINE

## 2023-11-28 PROCEDURE — 3074F SYST BP LT 130 MM HG: CPT | Performed by: INTERNAL MEDICINE

## 2023-11-28 RX ORDER — MAGNESIUM OXIDE 400 MG/1
400 TABLET ORAL DAILY
Qty: 90 TABLET | Refills: 1 | Status: SHIPPED | OUTPATIENT
Start: 2023-11-28

## 2023-11-28 NOTE — PROGRESS NOTES
[facility]   Office Note  11/28/2023     Subjective:  Mr. Mimi Joyce presents for follow up for CAD with CABG, Ischemic cardiomyopathy and dual chamber ICD   V FIB ICD discharge 10.20.23   Seen by Dr Rocael Jackson from our EP team for management. Stat lexiscan ordered but not completed. Koi:    OV 11/10/22, patient reported he is here today due to swollen legs and SOB which he has been experiencing on and off for 2 years. His PCP recommended an echocardiogram which was performed 1/1/2022. He states he takes 2-3 nitros per week for chest pain. This is chronic pattern. He reported he had having difficulty getting a job because he cannot retain information. His PCP has recommends disability allowance due to his health care issues. He reported he was recently diagnosed with diabetes. He noted he stopped taking Asprin and Imdur 2 years ago. He takes his Lisinopril. He is non compliant with follow up in office. LOV 3/7/23, patient reported he still takes nitro 1-3 times weekly for chest pain. He stated Imdur gives him headaches therefore he had only been taking half tablet. He reported he now takes Lasix 20 mg twice daily for lower leg extremity edema. LOV 9/19/23, patient reported he is having issues sending his pacemaker transmissions. He reported his heart racing at times. He stated he had only been taking his Imdur as needed. He stated he has lost 60 lb in last year. Patient denies current edema, chest pain, sob, palpitations, dizziness or syncope. Patient is taking all cardiac medications as prescribed and tolerates them well. On 10/20/23 at 00:54 patient received 1 shock for VF. Patient was called by the office and advised to go to ED. Patient presented to ED for further evaluation. Upon arrival in the ED, vitals reassuring. Patient complained of a soreness around the area of his AICD but had no other complaints or concerns.  Pacemaker was interrogated here in the ER and appeared to be

## 2023-11-28 NOTE — PATIENT INSTRUCTIONS
Plan:  Current medications reviewed. Refills given as warranted. Start Magnesium 400 mg daily   Continue to encourage heart healthy, low sodium diet and regular physical exercise for at least 30 minutes a minimum of 3-5 times per week. Lab work from October 2023 reviewed. Please Call [phone] to schedule Stress test.    I will call you with stress test results then we will make follow up appt.

## 2023-12-13 RX ORDER — EZETIMIBE 10 MG/1
TABLET ORAL
Qty: 90 TABLET | Refills: 3 | Status: SHIPPED | OUTPATIENT
Start: 2023-12-13

## 2024-01-09 ENCOUNTER — HOSPITAL ENCOUNTER (OUTPATIENT)
Dept: CARDIOLOGY | Age: 49
Discharge: HOME | End: 2024-01-09
Payer: COMMERCIAL

## 2024-01-09 DIAGNOSIS — I49.9 CARDIAC ARRHYTHMIA, UNSPECIFIED CARDIAC ARRHYTHMIA TYPE: ICD-10-CM

## 2024-01-09 PROCEDURE — A9502 TC99M TETROFOSMIN: HCPCS | Performed by: INTERNAL MEDICINE

## 2024-01-09 PROCEDURE — 6360000002 HC RX W HCPCS: Performed by: INTERNAL MEDICINE

## 2024-01-09 PROCEDURE — 93017 CV STRESS TEST TRACING ONLY: CPT

## 2024-01-09 PROCEDURE — 3430000000 HC RX DIAGNOSTIC RADIOPHARMACEUTICAL: Performed by: INTERNAL MEDICINE

## 2024-01-09 PROCEDURE — 78452 HT MUSCLE IMAGE SPECT MULT: CPT

## 2024-01-09 RX ORDER — REGADENOSON 0.08 MG/ML
0.4 INJECTION, SOLUTION INTRAVENOUS
Status: COMPLETED | OUTPATIENT
Start: 2024-01-09 | End: 2024-01-09

## 2024-01-09 RX ADMIN — TETROFOSMIN 29.8 MILLICURIE: 1.38 INJECTION, POWDER, LYOPHILIZED, FOR SOLUTION INTRAVENOUS at 15:15

## 2024-01-09 RX ADMIN — REGADENOSON 0.4 MG: 0.08 INJECTION, SOLUTION INTRAVENOUS at 15:15

## 2024-01-09 RX ADMIN — TETROFOSMIN 10.8 MILLICURIE: 1.38 INJECTION, POWDER, LYOPHILIZED, FOR SOLUTION INTRAVENOUS at 13:52

## 2024-01-09 NOTE — DISCHARGE INSTRUCTIONS
Follow up with the ordering physician for the final stress test results.  Rest 2 hours after completion of test.   Resume diet.   Resume medications.  If you have chest pain or any concerns, contact your physician. If you are unable to contact your physician, go to the nearest emergency room or call 9-1-1.

## 2024-01-10 ENCOUNTER — TELEPHONE (OUTPATIENT)
Dept: CARDIOLOGY CLINIC | Age: 49
End: 2024-01-10

## 2024-01-10 NOTE — TELEPHONE ENCOUNTER
PT called and stated he had a missed call from [facility]. PT apologizes for missing the call. PT stated he was speaking to another Doctor at an appointment. PT stated he will be available to accept any calls now. Pt ph#[phone]

## 2024-01-10 NOTE — TELEPHONE ENCOUNTER
----- Message from William Dennis MD sent at 1/10/2024  7:26 AM EST -----  Nuc stress test is abnormal and given ICD shock in 10/23 and hx CABG he needs LHC. I would schedule if patient willing. If he wants to d/w doc then can wait for RKG to discuss or I can call if needed.

## 2024-01-10 NOTE — TELEPHONE ENCOUNTER
Dr. Dennis, can you call patient and talk with him, please.  He had an unexpected CABG in past with complications and has some concerns.  I told him I would have you call him and answer his questions and if he wants to proceed that you would let me know and I would forward to Sandrine.  I do have him set up to see RKG on Monday but this can be cancelled.      [phone]

## 2024-01-12 NOTE — TELEPHONE ENCOUNTER
I've tried to call multiple times with no answer. Leave this message for Dr. Alexandra to deal with when back Monday. If patient has CP not going away with NTG then he needs to go to ER asap for evaluation.

## 2024-01-12 NOTE — TELEPHONE ENCOUNTER
Dr. Dennis  Patient called back and said he is sorry he has missed your phone calls.  He had trouble with his ringer on his phone.  Would you please call him.  Thank you

## 2024-01-12 NOTE — TELEPHONE ENCOUNTER
I called and spoke to Mr. Huang. I recommended cath to evaluate abnormal nuc study and hx CAD s/p CABG and CP story. He is afraid to have cath but is leaning towards doing it. He has a lot of anxiety. He wants to speak to Dr. Alexandra when he comes back on Monday. Please make sure [facility] gets message so he can call Mr. Huang back and discuss. Thanks.

## 2024-01-22 PROCEDURE — 93296 REM INTERROG EVL PM/IDS: CPT | Performed by: INTERNAL MEDICINE

## 2024-01-22 PROCEDURE — 93295 DEV INTERROG REMOTE 1/2/MLT: CPT | Performed by: INTERNAL MEDICINE

## 2024-02-13 NOTE — PROGRESS NOTES
[facility]   Office Note  2/26/2024     Subjective:  Mr. Huang presents for follow up for CAD with CABG, Ischemic cardiomyopathy and dual chamber ICD   V FIB ICD discharge 10.20.23  Seen by Dr Mike Lopez from our EP team for management. Stat lexiscan ordered it shows ischemia anterior and inferior walls.  He has no angina.He is here to discuss next step.  [place]:   Device check 1/2/24 CRISTIAN 2.83 yrs.  AP 18.1%,  RV 0.04%.      Lexiscan 1/9/24 was abnormal.  moderate-sized, partially reversible basal to mid-anterior defect, consistent with mixed ischemia and scar. There is also a small, partially reversible apical inferior defect, consistent with mixed ischemia and scar. EF=29% with diffuse hypokinesis and more severe hypokinesis of the anterior wall.        Today, 2/26/24  He reports he used to weigh 260 lbs and today he was 166 lbs.  He has trouble taking statins. He denies further shocks palpitations dizziness or syncope.  Patient reports he is taking prednisone for his hip rubbing bone on bone.  He is following with pcp and Dr. Rehman from orthopedics.  PMH:  On 10/20/23 at 00:54 patient received 1 shock for VF. Patient was called by the office and advised to go to ED. Patient presented to ED for further evaluation. Upon arrival in the ED, vitals reassuring.  Patient complained of a soreness around the area of his AICD but had no other complaints or concerns. Pacemaker was interrogated here in the ER and appeared to be functioning normally.  His troponin is within normal limits.  Chest x-ray is reassuring.  CT head was performed as he is unsure if he hit his head when he fell earlier today and was negative for acute concerning findings.  I did recommend hospitalization for further work-up and treatment, per chart review it appears that patient would benefit from seeing EP however his son is getting  tomorrow and he declined hospitalization. Patient left against medical advice.   Patient seen in

## 2024-02-26 ENCOUNTER — OFFICE VISIT (OUTPATIENT)
Dept: CARDIOLOGY CLINIC | Age: 49
End: 2024-02-26
Payer: COMMERCIAL

## 2024-02-26 ENCOUNTER — TELEPHONE (OUTPATIENT)
Dept: CARDIOLOGY CLINIC | Age: 49
End: 2024-02-26

## 2024-02-26 VITALS
DIASTOLIC BLOOD PRESSURE: 78 MMHG | WEIGHT: 166 LBS | SYSTOLIC BLOOD PRESSURE: 126 MMHG | HEIGHT: 68 IN | OXYGEN SATURATION: 98 % | BODY MASS INDEX: 25.16 KG/M2 | HEART RATE: 68 BPM

## 2024-02-26 DIAGNOSIS — Z78.9 STATIN INTOLERANCE: ICD-10-CM

## 2024-02-26 DIAGNOSIS — I25.110 CORONARY ARTERY DISEASE INVOLVING NATIVE CORONARY ARTERY OF NATIVE HEART WITH UNSTABLE ANGINA PECTORIS (HCC): ICD-10-CM

## 2024-02-26 DIAGNOSIS — I25.5 ISCHEMIC CARDIOMYOPATHY: Primary | ICD-10-CM

## 2024-02-26 DIAGNOSIS — E78.00 PURE HYPERCHOLESTEROLEMIA: ICD-10-CM

## 2024-02-26 DIAGNOSIS — Z95.810 ICD (IMPLANTABLE CARDIOVERTER-DEFIBRILLATOR) IN PLACE: ICD-10-CM

## 2024-02-26 DIAGNOSIS — I10 PRIMARY HYPERTENSION: ICD-10-CM

## 2024-02-26 PROCEDURE — 99214 OFFICE O/P EST MOD 30 MIN: CPT | Performed by: INTERNAL MEDICINE

## 2024-02-26 PROCEDURE — 3074F SYST BP LT 130 MM HG: CPT | Performed by: INTERNAL MEDICINE

## 2024-02-26 PROCEDURE — 3078F DIAST BP <80 MM HG: CPT | Performed by: INTERNAL MEDICINE

## 2024-02-26 NOTE — TELEPHONE ENCOUNTER
Pt had abnormal stress test and RKG would like for pt to see interventionalist before cath is ordered.  He is requesting dr with soonest appointment.

## 2024-02-27 NOTE — TELEPHONE ENCOUNTER
Pt added to SRJ schedule 2/29/24.  Called and spoke with patient and appt confirmed for 2:15 @ [facility].

## 2024-02-28 ENCOUNTER — TELEPHONE (OUTPATIENT)
Dept: CARDIOLOGY CLINIC | Age: 49
End: 2024-02-28

## 2024-02-28 NOTE — TELEPHONE ENCOUNTER
Pt called in very anxiously wanting to speak to srj RN. RN not available so pt settled to talk to MA. Pt stated \" I looked dr. Guzmán up on WebMD and Google and I'm not finding much on him and the things I'm finding aren't very good outcomes\"  pt wanted extensive background information on SRJ. Pt asked how long has SRJ been in cardiology and where srj went to school. Pt states he had a cath many years ago and it \"ended badly and ended in open heart surgery\". I offered to send a message to PMKW so she could call and answer any questions he may have , Pt refused and got angry with me so I assured pt SRJ would address every concern he has and srj is very thorough.

## 2024-02-29 NOTE — PATIENT INSTRUCTIONS
Plan:  9/18/23 Care Everywhere Labs reviewed and discussed with patient.  Current medications reviewed.  Refills given as warranted.  I want you to meet with one of my partners at [place] before you have an angiogram to discuss having an angiogram of your heart to look for blockages.  If you have a blockage that needs to be fixed most of the time it can be fixed right then.  Make sure you pack an overnight bag.   My office will call you to schedule the angiogram.  Recommend taking repatha if you can afford it to help lower your cholesterol.    Follow up with me in 3 months   Still not eating well, c/o urine being strong, reminded several times needs to drink.

## 2024-03-08 ENCOUNTER — OFFICE VISIT (OUTPATIENT)
Age: 49
End: 2024-03-08

## 2024-03-08 VITALS
TEMPERATURE: 98.5 F | BODY MASS INDEX: 25.01 KG/M2 | HEART RATE: 96 BPM | OXYGEN SATURATION: 96 % | WEIGHT: 165 LBS | RESPIRATION RATE: 17 BRPM | DIASTOLIC BLOOD PRESSURE: 88 MMHG | SYSTOLIC BLOOD PRESSURE: 137 MMHG | HEIGHT: 68 IN

## 2024-03-08 DIAGNOSIS — R06.2 WHEEZING: ICD-10-CM

## 2024-03-08 DIAGNOSIS — R05.1 ACUTE COUGH: ICD-10-CM

## 2024-03-08 DIAGNOSIS — J40 BRONCHITIS: Primary | ICD-10-CM

## 2024-03-08 PROBLEM — I20.89 STABLE ANGINA: Status: ACTIVE | Noted: 2024-03-01

## 2024-03-08 PROBLEM — E11.40 TYPE 2 DIABETES MELLITUS WITH DIABETIC NEUROPATHY, WITHOUT LONG-TERM CURRENT USE OF INSULIN (HCC): Status: ACTIVE | Noted: 2023-06-15

## 2024-03-08 PROBLEM — I73.9 CLAUDICATION OF BOTH LOWER EXTREMITIES (HCC): Status: ACTIVE | Noted: 2024-03-01

## 2024-03-08 PROBLEM — G56.40: Status: ACTIVE | Noted: 2018-06-19

## 2024-03-08 PROBLEM — I95.1 ORTHOSTASIS: Status: ACTIVE | Noted: 2023-06-15

## 2024-03-08 PROBLEM — F32.9 REACTIVE DEPRESSION: Status: ACTIVE | Noted: 2020-03-03

## 2024-03-08 RX ORDER — ALBUTEROL SULFATE 90 UG/1
2 AEROSOL, METERED RESPIRATORY (INHALATION) 4 TIMES DAILY PRN
Qty: 18 G | Refills: 0 | Status: SHIPPED | OUTPATIENT
Start: 2024-03-08

## 2024-03-08 RX ORDER — BENZONATATE 200 MG/1
200 CAPSULE ORAL 3 TIMES DAILY PRN
Qty: 30 CAPSULE | Refills: 0 | Status: SHIPPED | OUTPATIENT
Start: 2024-03-08

## 2024-03-08 RX ORDER — AZITHROMYCIN 250 MG/1
TABLET, FILM COATED ORAL
Qty: 6 TABLET | Refills: 0 | Status: SHIPPED | OUTPATIENT
Start: 2024-03-08 | End: 2024-03-18

## 2024-03-08 RX ORDER — IPRATROPIUM BROMIDE AND ALBUTEROL SULFATE 2.5; .5 MG/3ML; MG/3ML
1 SOLUTION RESPIRATORY (INHALATION) ONCE
Status: COMPLETED | OUTPATIENT
Start: 2024-03-08 | End: 2024-03-08

## 2024-03-08 RX ORDER — PREDNISONE 20 MG/1
20 TABLET ORAL DAILY
Qty: 5 TABLET | Refills: 0 | Status: SHIPPED | OUTPATIENT
Start: 2024-03-08 | End: 2024-03-13

## 2024-03-08 RX ORDER — DEXTROMETHORPHAN HYDROBROMIDE AND PROMETHAZINE HYDROCHLORIDE 15; 6.25 MG/5ML; MG/5ML
5 SYRUP ORAL 4 TIMES DAILY PRN
Qty: 120 ML | Refills: 0 | Status: SHIPPED | OUTPATIENT
Start: 2024-03-08 | End: 2024-03-15

## 2024-03-08 RX ADMIN — IPRATROPIUM BROMIDE AND ALBUTEROL SULFATE 1 DOSE: 2.5; .5 SOLUTION RESPIRATORY (INHALATION) at 19:07

## 2024-03-08 NOTE — PROGRESS NOTES
Luciano Huang (:  1975) is a 48 y.o. male,New patient, here for evaluation of the following chief complaint(s):  Cough (For 3 days now, coughing, difficulty breathing and tightness in chest. History of Bronchitis. He has a defibrillator in and did have an event in October. Some arteries are blocked and has a procedure on 24.)      ASSESSMENT/PLAN:    ICD-10-CM    1. Bronchitis  J40 predniSONE (DELTASONE) 20 MG tablet     ipratropium 0.5 mg-albuterol 2.5 mg (DUONEB) nebulizer solution 1 Dose     benzonatate (TESSALON) 200 MG capsule     promethazine-dextromethorphan (PROMETHAZINE-DM) 6.25-15 MG/5ML syrup      2. Wheezing  R06.2 predniSONE (DELTASONE) 20 MG tablet     ipratropium 0.5 mg-albuterol 2.5 mg (DUONEB) nebulizer solution 1 Dose      3. Acute cough  R05.1 benzonatate (TESSALON) 200 MG capsule     promethazine-dextromethorphan (PROMETHAZINE-DM) 6.25-15 MG/5ML syrup          Taking the homeopathic Simbacol for cough  Nebulizer  DUONEB given and had a lot of relief post treatment  Follow up with your pcp in 7 days if symptoms persist or if symptoms worsen.  Due to heart history and the current presentation with the cough I will prescribe an antibiotic, no xray today.     SUBJECTIVE/OBJECTIVE:    History provided by:  Patient   used: No      HPI:   48 y.o. male presents with symptoms of cough, chest tightness, and shortness of breath ongoing since 3 days ago. Denies vomiting, diarrhea. Has taken nothing for symptoms for symptoms.    Vitals:    24 1828   BP: 137/88   Site: Left Upper Arm   Position: Sitting   Cuff Size: Medium Adult   Pulse: 96   Resp: 17   Temp: 98.5 °F (36.9 °C)   TempSrc: Oral   SpO2: 96%   Weight: 74.8 kg (165 lb)   Height: 1.727 m (5' 8\")       Review of Systems    Physical Exam  Vitals and nursing note reviewed.   Constitutional:       Appearance: Normal appearance.   HENT:      Head: Normocephalic.      Right Ear: Hearing, tympanic membrane, ear

## 2024-03-09 NOTE — PATIENT INSTRUCTIONS
Taking the homeopathic Simbacol for cough  Nebulizer  DUONEB given   Follow up with your pcp in 7 days if symptoms persist or if symptoms worsen.  Due to heart history and the current presentation with the cough I will prescribe an antibiotic, no xray today.   New Prescriptions    ALBUTEROL SULFATE HFA (VENTOLIN HFA) 108 (90 BASE) MCG/ACT INHALER    Inhale 2 puffs into the lungs 4 times daily as needed for Wheezing    AZITHROMYCIN (ZITHROMAX) 250 MG TABLET    500mg on day 1 followed by 250mg on days 2 - 5    BENZONATATE (TESSALON) 200 MG CAPSULE    Take 1 capsule by mouth 3 times daily as needed for Cough    PREDNISONE (DELTASONE) 20 MG TABLET    Take 1 tablet by mouth daily for 5 days    PROMETHAZINE-DEXTROMETHORPHAN (PROMETHAZINE-DM) 6.25-15 MG/5ML SYRUP    Take 5 mLs by mouth 4 times daily as needed for Cough

## 2024-03-18 ENCOUNTER — TELEPHONE (OUTPATIENT)
Dept: CARDIOLOGY CLINIC | Age: 49
End: 2024-03-18

## 2024-03-18 NOTE — TELEPHONE ENCOUNTER
[facility] pharmacy stated that they have reached out to pt to schedule Reptha order but they have not been able to successfully speak to pt. They wanted to inform the office. fyi

## 2024-04-24 PROCEDURE — 93295 DEV INTERROG REMOTE 1/2/MLT: CPT | Performed by: INTERNAL MEDICINE

## 2024-04-24 PROCEDURE — 93296 REM INTERROG EVL PM/IDS: CPT | Performed by: INTERNAL MEDICINE

## 2024-06-08 ENCOUNTER — OFFICE VISIT (OUTPATIENT)
Age: 49
End: 2024-06-08

## 2024-06-08 VITALS
DIASTOLIC BLOOD PRESSURE: 83 MMHG | TEMPERATURE: 98.2 F | BODY MASS INDEX: 25.01 KG/M2 | HEART RATE: 71 BPM | WEIGHT: 165 LBS | SYSTOLIC BLOOD PRESSURE: 138 MMHG | RESPIRATION RATE: 16 BRPM | OXYGEN SATURATION: 98 % | HEIGHT: 68 IN

## 2024-06-08 DIAGNOSIS — T14.8XXA WOUND INFECTION: Primary | ICD-10-CM

## 2024-06-08 DIAGNOSIS — L08.9 WOUND INFECTION: Primary | ICD-10-CM

## 2024-06-08 DIAGNOSIS — L73.9 CHRONIC FOLLICULITIS: ICD-10-CM

## 2024-06-08 RX ORDER — CLOPIDOGREL BISULFATE 75 MG/1
75 TABLET ORAL DAILY
COMMUNITY
Start: 2024-05-28

## 2024-06-08 RX ORDER — DOXYCYCLINE HYCLATE 100 MG
100 TABLET ORAL 2 TIMES DAILY
Qty: 20 TABLET | Refills: 0 | Status: SHIPPED | OUTPATIENT
Start: 2024-06-08 | End: 2024-06-18

## 2024-06-08 NOTE — PROGRESS NOTES
Luciano Huang Jr. (:  1975) is a 49 y.o. male,Established patient, here for evaluation of the following chief complaint(s):  Wound Check (Right side 3 days )      ASSESSMENT/PLAN:    ICD-10-CM    1. Wound infection  T14.8XXA     L08.9       2. Chronic folliculitis  L73.9             Differential Diagnosis HR, Hidradenitis Suppurativa  Please see Dr. Mcrae. And Dermatologist for diagnosis.   Follow up with your pcp in 7 days if symptoms persist or if symptoms worsen.   SUBJECTIVE/OBJECTIVE:    History provided by:  Patient   used: No    Wound Check      HPI:   49 y.o. male presents with symptoms of right sided wound check  ongoing since 3 days the abscess ruptured and did not refill but headed open. . Denies n/v/d. Has taken nothing for symptoms.    Vitals:    24 1723   BP: 138/83   Site: Left Upper Arm   Position: Sitting   Cuff Size: Small Adult   Pulse: 71   Resp: 16   Temp: 98.2 °F (36.8 °C)   TempSrc: Oral   SpO2: 98%   Weight: 74.8 kg (165 lb)   Height: 1.727 m (5' 8\")       Review of Systems    Physical Exam  Vitals and nursing note reviewed.   Constitutional:       Appearance: Normal appearance.   Skin:     General: Skin is warm and dry.      Capillary Refill: Capillary refill takes less than 2 seconds.      Findings: Erythema and wound present.             Comments: 2 cm by 2cm circular ruptured abscess that did not encapsulate but is healing with as an opened lesion.    Neurological:      Mental Status: He is alert and oriented to person, place, and time.   Psychiatric:         Mood and Affect: Mood normal.         Behavior: Behavior normal.           An electronic signature was used to authenticate this note.    --Saroj Keyes, APRN - CNP

## 2024-06-08 NOTE — PATIENT INSTRUCTIONS
Differential Diagnosis HR, Hidradenitis Suppurativa  Please see Dr. Mcrae. And Dermatologist for diagnosis.   Follow up with your pcp in 7 days if symptoms persist or if symptoms worsen.   New Prescriptions    DOXYCYCLINE HYCLATE (VIBRA-TABS) 100 MG TABLET    Take 1 tablet by mouth 2 times daily for 10 days    MUPIROCIN (BACTROBAN) 2 % OINTMENT    Apply topically 3 times daily.

## 2024-06-10 ENCOUNTER — HOSPITAL ENCOUNTER (OUTPATIENT)
Dept: CARDIAC REHAB | Age: 49
Setting detail: THERAPIES SERIES
Discharge: HOME | End: 2024-06-10
Payer: COMMERCIAL

## 2024-06-10 PROCEDURE — 93798 PHYS/QHP OP CAR RHAB W/ECG: CPT

## 2024-06-17 ENCOUNTER — HOSPITAL ENCOUNTER (OUTPATIENT)
Dept: CARDIAC REHAB | Age: 49
Setting detail: THERAPIES SERIES
Discharge: HOME | End: 2024-06-17
Payer: COMMERCIAL

## 2024-06-17 PROCEDURE — 93798 PHYS/QHP OP CAR RHAB W/ECG: CPT

## 2024-06-18 LAB
GLUCOSE BLD-MCNC: 134 MG/DL (ref 70–99)
GLUCOSE BLD-MCNC: 140 MG/DL (ref 70–99)
PERFORMED ON: ABNORMAL
PERFORMED ON: ABNORMAL

## 2024-06-19 ENCOUNTER — HOSPITAL ENCOUNTER (OUTPATIENT)
Dept: CARDIAC REHAB | Age: 49
Setting detail: THERAPIES SERIES
Discharge: HOME | End: 2024-06-19
Payer: COMMERCIAL

## 2024-06-19 PROCEDURE — 93798 PHYS/QHP OP CAR RHAB W/ECG: CPT

## 2024-06-20 LAB
GLUCOSE BLD-MCNC: 136 MG/DL (ref 70–99)
GLUCOSE BLD-MCNC: 158 MG/DL (ref 70–99)
PERFORMED ON: ABNORMAL
PERFORMED ON: ABNORMAL

## 2024-06-21 ENCOUNTER — HOSPITAL ENCOUNTER (OUTPATIENT)
Dept: CARDIAC REHAB | Age: 49
Setting detail: THERAPIES SERIES
Discharge: HOME | End: 2024-06-21
Payer: COMMERCIAL

## 2024-06-21 PROCEDURE — 93798 PHYS/QHP OP CAR RHAB W/ECG: CPT

## 2024-06-24 ENCOUNTER — HOSPITAL ENCOUNTER (OUTPATIENT)
Dept: CARDIAC REHAB | Age: 49
Setting detail: THERAPIES SERIES
Discharge: HOME | End: 2024-06-24
Payer: COMMERCIAL

## 2024-06-24 PROCEDURE — 93798 PHYS/QHP OP CAR RHAB W/ECG: CPT

## 2024-06-25 LAB
GLUCOSE BLD-MCNC: 109 MG/DL (ref 70–99)
GLUCOSE BLD-MCNC: 134 MG/DL (ref 70–99)
GLUCOSE BLD-MCNC: 157 MG/DL (ref 70–99)
PERFORMED ON: ABNORMAL

## 2024-06-26 ENCOUNTER — HOSPITAL ENCOUNTER (OUTPATIENT)
Dept: CARDIAC REHAB | Age: 49
Setting detail: THERAPIES SERIES
Discharge: HOME | End: 2024-06-26
Payer: COMMERCIAL

## 2024-06-26 PROCEDURE — 93798 PHYS/QHP OP CAR RHAB W/ECG: CPT

## 2024-06-27 LAB
GLUCOSE BLD-MCNC: 145 MG/DL (ref 70–99)
PERFORMED ON: ABNORMAL

## 2024-06-28 ENCOUNTER — HOSPITAL ENCOUNTER (OUTPATIENT)
Dept: CARDIAC REHAB | Age: 49
Setting detail: THERAPIES SERIES
Discharge: HOME | End: 2024-06-28
Payer: COMMERCIAL

## 2024-06-28 PROCEDURE — 93798 PHYS/QHP OP CAR RHAB W/ECG: CPT

## 2024-07-01 ENCOUNTER — HOSPITAL ENCOUNTER (OUTPATIENT)
Dept: CARDIAC REHAB | Age: 49
Setting detail: THERAPIES SERIES
Discharge: HOME | End: 2024-07-01
Payer: COMMERCIAL

## 2024-07-01 LAB
GLUCOSE BLD-MCNC: 125 MG/DL (ref 70–99)
GLUCOSE BLD-MCNC: 132 MG/DL (ref 70–99)
PERFORMED ON: ABNORMAL
PERFORMED ON: ABNORMAL

## 2024-07-01 PROCEDURE — 93798 PHYS/QHP OP CAR RHAB W/ECG: CPT

## 2024-07-03 ENCOUNTER — HOSPITAL ENCOUNTER (OUTPATIENT)
Dept: CARDIAC REHAB | Age: 49
Setting detail: THERAPIES SERIES
Discharge: HOME | End: 2024-07-03
Payer: COMMERCIAL

## 2024-07-03 PROCEDURE — 93798 PHYS/QHP OP CAR RHAB W/ECG: CPT

## 2024-07-05 ENCOUNTER — APPOINTMENT (OUTPATIENT)
Dept: CARDIAC REHAB | Age: 49
End: 2024-07-05
Payer: COMMERCIAL

## 2024-07-10 ENCOUNTER — HOSPITAL ENCOUNTER (OUTPATIENT)
Dept: CARDIAC REHAB | Age: 49
Setting detail: THERAPIES SERIES
Discharge: HOME | End: 2024-07-10
Payer: COMMERCIAL

## 2024-07-10 PROCEDURE — 93798 PHYS/QHP OP CAR RHAB W/ECG: CPT

## 2024-07-12 ENCOUNTER — HOSPITAL ENCOUNTER (OUTPATIENT)
Dept: CARDIAC REHAB | Age: 49
Setting detail: THERAPIES SERIES
Discharge: HOME | End: 2024-07-12
Payer: COMMERCIAL

## 2024-07-12 PROCEDURE — 93798 PHYS/QHP OP CAR RHAB W/ECG: CPT

## 2024-07-15 ENCOUNTER — HOSPITAL ENCOUNTER (OUTPATIENT)
Dept: CARDIAC REHAB | Age: 49
Setting detail: THERAPIES SERIES
Discharge: HOME | End: 2024-07-15
Payer: COMMERCIAL

## 2024-07-15 PROCEDURE — 93798 PHYS/QHP OP CAR RHAB W/ECG: CPT

## 2024-07-19 ENCOUNTER — HOSPITAL ENCOUNTER (OUTPATIENT)
Dept: CARDIAC REHAB | Age: 49
Setting detail: THERAPIES SERIES
Discharge: HOME | End: 2024-07-19
Payer: COMMERCIAL

## 2024-07-19 PROCEDURE — 93798 PHYS/QHP OP CAR RHAB W/ECG: CPT

## 2024-07-22 ENCOUNTER — HOSPITAL ENCOUNTER (OUTPATIENT)
Dept: CARDIAC REHAB | Age: 49
Setting detail: THERAPIES SERIES
Discharge: HOME | End: 2024-07-22
Payer: COMMERCIAL

## 2024-07-22 PROCEDURE — 93798 PHYS/QHP OP CAR RHAB W/ECG: CPT

## 2024-07-24 PROCEDURE — 93296 REM INTERROG EVL PM/IDS: CPT | Performed by: INTERNAL MEDICINE

## 2024-07-24 PROCEDURE — 93295 DEV INTERROG REMOTE 1/2/MLT: CPT | Performed by: INTERNAL MEDICINE

## 2024-07-26 ENCOUNTER — HOSPITAL ENCOUNTER (OUTPATIENT)
Dept: CARDIAC REHAB | Age: 49
Setting detail: THERAPIES SERIES
Discharge: HOME | End: 2024-07-26
Payer: COMMERCIAL

## 2024-07-26 PROCEDURE — 93798 PHYS/QHP OP CAR RHAB W/ECG: CPT

## 2024-07-29 ENCOUNTER — HOSPITAL ENCOUNTER (OUTPATIENT)
Dept: CARDIAC REHAB | Age: 49
Setting detail: THERAPIES SERIES
Discharge: HOME | End: 2024-07-29
Payer: COMMERCIAL

## 2024-07-29 PROCEDURE — 93798 PHYS/QHP OP CAR RHAB W/ECG: CPT

## 2024-08-02 ENCOUNTER — HOSPITAL ENCOUNTER (OUTPATIENT)
Dept: CARDIAC REHAB | Age: 49
Setting detail: THERAPIES SERIES
Discharge: HOME | End: 2024-08-02
Payer: COMMERCIAL

## 2024-08-02 PROCEDURE — 93798 PHYS/QHP OP CAR RHAB W/ECG: CPT

## 2024-08-05 ENCOUNTER — HOSPITAL ENCOUNTER (OUTPATIENT)
Dept: CARDIAC REHAB | Age: 49
Setting detail: THERAPIES SERIES
Discharge: HOME | End: 2024-08-05
Payer: COMMERCIAL

## 2024-08-05 PROCEDURE — 93798 PHYS/QHP OP CAR RHAB W/ECG: CPT

## 2024-08-07 ENCOUNTER — HOSPITAL ENCOUNTER (OUTPATIENT)
Dept: CARDIAC REHAB | Age: 49
Setting detail: THERAPIES SERIES
Discharge: HOME | End: 2024-08-07
Payer: COMMERCIAL

## 2024-08-07 PROCEDURE — 93798 PHYS/QHP OP CAR RHAB W/ECG: CPT

## 2024-08-12 ENCOUNTER — HOSPITAL ENCOUNTER (OUTPATIENT)
Dept: CARDIAC REHAB | Age: 49
Setting detail: THERAPIES SERIES
Discharge: HOME | End: 2024-08-12
Payer: COMMERCIAL

## 2024-08-12 ENCOUNTER — TELEPHONE (OUTPATIENT)
Dept: CARDIOLOGY CLINIC | Age: 49
End: 2024-08-12

## 2024-08-12 NOTE — TELEPHONE ENCOUNTER
Steffi from the device clinic at [facility] stated that they need us to release pts remote monitoring information to them. Steffi can be reached at [phone].

## 2024-08-13 NOTE — TELEPHONE ENCOUNTER
Called Steffi from [facility] - let her know patient appears to be have been released on our end, she v/u and states they will enroll patient for monitoring.

## 2024-08-14 ENCOUNTER — APPOINTMENT (OUTPATIENT)
Dept: CARDIAC REHAB | Age: 49
End: 2024-08-14
Payer: COMMERCIAL

## 2024-08-16 ENCOUNTER — APPOINTMENT (OUTPATIENT)
Dept: CARDIAC REHAB | Age: 49
End: 2024-08-16
Payer: COMMERCIAL

## 2024-08-19 ENCOUNTER — HOSPITAL ENCOUNTER (OUTPATIENT)
Dept: CARDIAC REHAB | Age: 49
Setting detail: THERAPIES SERIES
Discharge: HOME | End: 2024-08-19
Payer: COMMERCIAL

## 2024-08-21 ENCOUNTER — APPOINTMENT (OUTPATIENT)
Dept: CARDIAC REHAB | Age: 49
End: 2024-08-21
Payer: COMMERCIAL

## 2024-08-23 ENCOUNTER — APPOINTMENT (OUTPATIENT)
Dept: CARDIAC REHAB | Age: 49
End: 2024-08-23
Payer: COMMERCIAL

## 2024-08-26 ENCOUNTER — APPOINTMENT (OUTPATIENT)
Dept: CARDIAC REHAB | Age: 49
End: 2024-08-26
Payer: COMMERCIAL

## 2024-08-28 ENCOUNTER — APPOINTMENT (OUTPATIENT)
Dept: CARDIAC REHAB | Age: 49
End: 2024-08-28
Payer: COMMERCIAL

## 2024-08-30 ENCOUNTER — APPOINTMENT (OUTPATIENT)
Dept: CARDIAC REHAB | Age: 49
End: 2024-08-30
Payer: COMMERCIAL

## 2024-09-11 ENCOUNTER — HOSPITAL ENCOUNTER (OUTPATIENT)
Dept: CARDIAC REHAB | Age: 49
Setting detail: THERAPIES SERIES
Discharge: HOME | End: 2024-09-11

## 2024-10-23 ENCOUNTER — OFFICE VISIT (OUTPATIENT)
Age: 49
End: 2024-10-23

## 2024-10-23 VITALS
DIASTOLIC BLOOD PRESSURE: 66 MMHG | SYSTOLIC BLOOD PRESSURE: 116 MMHG | OXYGEN SATURATION: 97 % | TEMPERATURE: 97.9 F | HEART RATE: 66 BPM

## 2024-10-23 DIAGNOSIS — J45.901 EXACERBATION OF ASTHMA, UNSPECIFIED ASTHMA SEVERITY, UNSPECIFIED WHETHER PERSISTENT: ICD-10-CM

## 2024-10-23 DIAGNOSIS — J40 BRONCHITIS: Primary | ICD-10-CM

## 2024-10-23 DIAGNOSIS — R05.1 ACUTE COUGH: ICD-10-CM

## 2024-10-23 RX ORDER — BENZONATATE 200 MG/1
200 CAPSULE ORAL 3 TIMES DAILY PRN
Qty: 21 CAPSULE | Refills: 0 | Status: SHIPPED | OUTPATIENT
Start: 2024-10-23 | End: 2024-10-30

## 2024-10-23 RX ORDER — IPRATROPIUM BROMIDE AND ALBUTEROL SULFATE 2.5; .5 MG/3ML; MG/3ML
1 SOLUTION RESPIRATORY (INHALATION) ONCE
Status: COMPLETED | OUTPATIENT
Start: 2024-10-23 | End: 2024-10-23

## 2024-10-23 RX ORDER — SACUBITRIL AND VALSARTAN 24; 26 MG/1; MG/1
1 TABLET, FILM COATED ORAL 2 TIMES DAILY
COMMUNITY

## 2024-10-23 RX ORDER — TICAGRELOR 90 MG/1
TABLET ORAL
COMMUNITY

## 2024-10-23 RX ORDER — AZITHROMYCIN 250 MG/1
TABLET, FILM COATED ORAL
Qty: 6 TABLET | Refills: 0 | Status: SHIPPED | OUTPATIENT
Start: 2024-10-23

## 2024-10-23 RX ADMIN — IPRATROPIUM BROMIDE AND ALBUTEROL SULFATE 1 DOSE: 2.5; .5 SOLUTION RESPIRATORY (INHALATION) at 11:05

## 2024-10-23 NOTE — PATIENT INSTRUCTIONS
XR was negative.     Bronchitis with asthma exacerbation is suspected.     Continue your inhaler.     Increase prednisone to 40 mg for 3-5 days.     Tessalon pearls for cough. Zpak for antibiotic.     Go to ED if any chest pain or other concerning  cardiac symptoms.

## 2024-10-23 NOTE — PROGRESS NOTES
Luciano Huang Jr. (:  1975) is a 49 y.o. male,  here for evaluation of the following chief complaint(s): Shortness of Breath (Pt states he has a lot of wheezing and coughing up mucus and sinus pressure in his face and states he has trouble breathing /X 2-3 days/Pt states he does have cardiac hx so wanted to get checked)      Luciano Huang Jr. is a Established patient.   Last Urgent Care Visit: 2024  I have reviewed the patient's medications and basic medical history; see Medication Reconciliation.    ASSESSMENT/PLAN:  Diagnosis:     ICD-10-CM    1. Bronchitis  J40       2. Acute cough  R05.1 XR CHEST (2 VW)      3. Exacerbation of asthma, unspecified asthma severity, unspecified whether persistent  J45.901              Medical Decision Making:    Patient was seen at Urgent Care today for worsening productive cough, wheezing, chest congestion and myalgias x 3 days.   He has significant medical history including asthma and DM as well as prior cardiac history.   Medical chart including recent cardiology notes as well as last ED visit on 8/10/24 is reviewed.     On exam today there is mild wheezing bilaterally in all lung fields. He is not in any respiratory distress and is not hypoxic.     Chest Xray was obtained to evaluate for possible pneumonia.   Xray was initially interpreted by me for a wet read while awaiting official radiologist report. Based on my initial interpretation Xray showed: No acute infiltrate or acute cardiopulmonary process.   Radiology read: No actue findings.     Bronchitis with asthma exacerbation is suspected.     He is given Neb Treatment here in clinic.   Pt is on chronic prednisone secondary to chronic hip pain. He reports 20mg daily. I do recommend that he may increase this to 40 mg for 5 days due to his wheezing. He has enough inhalers at home, he will continue these.     Given his risk factors and history, he is also is prescribed promethazine-DM for cough and azithromycin for

## 2024-11-09 ENCOUNTER — HOSPITAL ENCOUNTER (EMERGENCY)
Age: 49
Discharge: HOME | End: 2024-11-09
Payer: COMMERCIAL

## 2024-11-09 VITALS
DIASTOLIC BLOOD PRESSURE: 97 MMHG | RESPIRATION RATE: 18 BRPM | WEIGHT: 165.2 LBS | HEART RATE: 66 BPM | TEMPERATURE: 98.3 F | OXYGEN SATURATION: 97 % | BODY MASS INDEX: 25.12 KG/M2 | SYSTOLIC BLOOD PRESSURE: 157 MMHG

## 2024-11-09 DIAGNOSIS — T14.8XXA SKIN AVULSION: Primary | ICD-10-CM

## 2024-11-09 PROCEDURE — 6360000002 HC RX W HCPCS: Performed by: NURSE PRACTITIONER

## 2024-11-09 PROCEDURE — 99284 EMERGENCY DEPT VISIT MOD MDM: CPT

## 2024-11-09 PROCEDURE — 90471 IMMUNIZATION ADMIN: CPT | Performed by: NURSE PRACTITIONER

## 2024-11-09 PROCEDURE — 90715 TDAP VACCINE 7 YRS/> IM: CPT | Performed by: NURSE PRACTITIONER

## 2024-11-09 RX ADMIN — TETANUS TOXOID, REDUCED DIPHTHERIA TOXOID AND ACELLULAR PERTUSSIS VACCINE, ADSORBED 0.5 ML: 5; 2.5; 8; 8; 2.5 SUSPENSION INTRAMUSCULAR at 22:38

## 2024-11-09 ASSESSMENT — PAIN DESCRIPTION - LOCATION: LOCATION: LEG

## 2024-11-09 ASSESSMENT — PAIN DESCRIPTION - ORIENTATION: ORIENTATION: LEFT

## 2024-11-09 ASSESSMENT — PAIN SCALES - GENERAL: PAINLEVEL_OUTOF10: 7

## 2024-11-09 ASSESSMENT — PAIN - FUNCTIONAL ASSESSMENT: PAIN_FUNCTIONAL_ASSESSMENT: 0-10

## 2024-12-12 ENCOUNTER — OFFICE VISIT (OUTPATIENT)
Age: 49
End: 2024-12-12

## 2024-12-12 VITALS
SYSTOLIC BLOOD PRESSURE: 147 MMHG | TEMPERATURE: 97.8 F | HEART RATE: 83 BPM | BODY MASS INDEX: 25.01 KG/M2 | WEIGHT: 165 LBS | DIASTOLIC BLOOD PRESSURE: 88 MMHG | OXYGEN SATURATION: 97 % | HEIGHT: 68 IN

## 2024-12-12 DIAGNOSIS — I10 ELEVATED BLOOD PRESSURE READING WITH DIAGNOSIS OF HYPERTENSION: ICD-10-CM

## 2024-12-12 DIAGNOSIS — J45.901 EXACERBATION OF ASTHMA, UNSPECIFIED ASTHMA SEVERITY, UNSPECIFIED WHETHER PERSISTENT: ICD-10-CM

## 2024-12-12 DIAGNOSIS — R07.9 CHEST PAIN, UNSPECIFIED TYPE: Primary | ICD-10-CM

## 2024-12-12 DIAGNOSIS — R06.2 WHEEZING: ICD-10-CM

## 2024-12-12 RX ORDER — IPRATROPIUM BROMIDE AND ALBUTEROL SULFATE 2.5; .5 MG/3ML; MG/3ML
1 SOLUTION RESPIRATORY (INHALATION) ONCE
Status: COMPLETED | OUTPATIENT
Start: 2024-12-12 | End: 2024-12-12

## 2024-12-12 RX ORDER — AZITHROMYCIN 250 MG/1
TABLET, FILM COATED ORAL
Qty: 6 TABLET | Refills: 0 | Status: SHIPPED | OUTPATIENT
Start: 2024-12-12 | End: 2024-12-22

## 2024-12-12 RX ORDER — BENZONATATE 100 MG/1
100 CAPSULE ORAL
Qty: 20 CAPSULE | Refills: 0 | Status: SHIPPED | OUTPATIENT
Start: 2024-12-12 | End: 2024-12-22

## 2024-12-12 RX ADMIN — IPRATROPIUM BROMIDE AND ALBUTEROL SULFATE 1 DOSE: 2.5; .5 SOLUTION RESPIRATORY (INHALATION) at 12:45

## 2024-12-12 NOTE — PATIENT INSTRUCTIONS
Keep hydrated, tylenol   (if no contraindications) as needed if pain or fever..  . follow up with PCP in 7- days if not better   Go to ER  if symptoms worse/feeling worse or has new symptoms or concerns,  if fever/chills, increase shortness of breath, increase congestion or wheezing despite medications, if associated with return of chest tightness/pain, nausea/vomiting, dizzy/ light-headed sensation.. .can  Increase his prednisone to 40 mg   (2 x 20 mg tablets ) a day x 5 days  May increase BP and blood sugar  -- he should monitor his sugars

## 2024-12-12 NOTE — PROGRESS NOTES
Luciano Huang Jr. (:  1975) is a 49 y.o. male,Established patient, here for evaluation of the following chief complaint(s):  Chest Pain and Cough      ASSESSMENT/PLAN:    ICD-10-CM    1. Chest pain, unspecified type  R07.9 EKG 12 Lead      2. Exacerbation of asthma, unspecified asthma severity, unspecified whether persistent  J45.901 azithromycin (ZITHROMAX) 250 MG tablet     benzonatate (TESSALON) 100 MG capsule      3. Wheezing  R06.2 ipratropium 0.5 mg-albuterol 2.5 mg (DUONEB) nebulizer solution 1 Dose      4. Elevated blood pressure reading with diagnosis of hypertension  I10 Non [facility] - External Referral To Primary Care         EKG NSR, Incomplete LBBB, ST/T wave abnormality, consider lateral ischemia, Abnormal EKG                    Seems to be no changes compared to EKG 10/2023   Post HH nebulizer , wheezing appears almost resolved (mild end expiration wheeze noted                         rhonchi cleared  Although feeling better,  Still advised going to ER because of his extensive heart history,  no improvement with home inhaler use,  had relief with nitro    Declined to go to ER for further evaluation  Differential diagnosis /possible life threatening events discussed      Keep hydrated, tylenol   (if no contraindications) as needed if pain or fever..  . follow up with PCP in 7- days if not better   Go to ER  if symptoms worse/feeling worse or has new symptoms or concerns,  if fever/chills, increase shortness of breath, increase congestion or wheezing despite medications, if associated with return of chest tightness/pain, nausea/vomiting, dizzy/ light-headed sensation.. .can  Increase his prednisone to 40 mg   (2 x 20 mg tablets ) a day x 5 days  May increase BP and blood sugar  -- he should monitor his sugars       SUBJECTIVE/OBJECTIVE:  No chief complaint on file.   No chief complaint on file.      Chest congestion last night , no relief with inhaler, hx asthma  then this morning chest tightness,

## 2024-12-14 ASSESSMENT — ENCOUNTER SYMPTOMS
SORE THROAT: 0
VOMITING: 0
CHEST TIGHTNESS: 1
SHORTNESS OF BREATH: 1
NAUSEA: 0
COUGH: 1
SINUS PRESSURE: 0

## 2024-12-26 RX ORDER — EZETIMIBE 10 MG/1
TABLET ORAL
Qty: 90 TABLET | Refills: 0 | OUTPATIENT
Start: 2024-12-26

## 2024-12-26 NOTE — TELEPHONE ENCOUNTER
Last Office Visit: 2/26/24 Provider: MELANY  **Is provider OOT? No    Next Office Visit: Visit date not found Provider:  patient follows [facility]    Lab orders needed? no   Encounter provider correct? No If not, change provider  Script changes since last refill? no    LAST LABS:   LIPIDS ON CARE EVERYWHERE 5/6/24    Spoke with pt and refused and left note for pharmacy to defer to Dr. Tez Hoyt at [facility].

## 2025-04-06 ENCOUNTER — OFFICE VISIT (OUTPATIENT)
Age: 50
End: 2025-04-06

## 2025-04-06 VITALS
TEMPERATURE: 97.1 F | DIASTOLIC BLOOD PRESSURE: 78 MMHG | HEART RATE: 66 BPM | BODY MASS INDEX: 25.85 KG/M2 | SYSTOLIC BLOOD PRESSURE: 118 MMHG | RESPIRATION RATE: 18 BRPM | OXYGEN SATURATION: 97 % | WEIGHT: 170 LBS

## 2025-04-06 DIAGNOSIS — Z91.89 AT HIGH RISK FOR PNEUMONIA: Primary | ICD-10-CM

## 2025-04-06 DIAGNOSIS — R05.1 ACUTE COUGH: ICD-10-CM

## 2025-04-06 DIAGNOSIS — J45.41 MODERATE PERSISTENT ASTHMA WITH ACUTE EXACERBATION: ICD-10-CM

## 2025-04-06 DIAGNOSIS — R06.02 SHORTNESS OF BREATH: ICD-10-CM

## 2025-04-06 DIAGNOSIS — J10.1 INFLUENZA A: ICD-10-CM

## 2025-04-06 RX ORDER — IPRATROPIUM BROMIDE AND ALBUTEROL SULFATE 2.5; .5 MG/3ML; MG/3ML
1 SOLUTION RESPIRATORY (INHALATION) ONCE
Status: COMPLETED | OUTPATIENT
Start: 2025-04-06 | End: 2025-04-06

## 2025-04-06 RX ORDER — PREDNISONE 20 MG/1
40 TABLET ORAL DAILY
Qty: 20 TABLET | Refills: 0 | Status: SHIPPED | OUTPATIENT
Start: 2025-04-06 | End: 2025-04-16

## 2025-04-06 RX ORDER — BENZONATATE 100 MG/1
100-200 CAPSULE ORAL 3 TIMES DAILY PRN
Qty: 60 CAPSULE | Refills: 0 | Status: SHIPPED | OUTPATIENT
Start: 2025-04-06 | End: 2025-04-16

## 2025-04-06 RX ORDER — ALBUTEROL SULFATE 90 UG/1
2 INHALANT RESPIRATORY (INHALATION) 4 TIMES DAILY PRN
Qty: 18 G | Refills: 0 | Status: SHIPPED | OUTPATIENT
Start: 2025-04-06

## 2025-04-06 RX ORDER — AZELASTINE 1 MG/ML
1 SPRAY, METERED NASAL 2 TIMES DAILY
Qty: 30 ML | Refills: 0 | Status: SHIPPED | OUTPATIENT
Start: 2025-04-06

## 2025-04-06 RX ORDER — LORATADINE 10 MG/1
10 TABLET ORAL DAILY
Qty: 30 TABLET | Refills: 0 | Status: SHIPPED | OUTPATIENT
Start: 2025-04-06

## 2025-04-06 RX ORDER — GUAIFENESIN 600 MG/1
1200 TABLET, EXTENDED RELEASE ORAL 2 TIMES DAILY
Qty: 40 TABLET | Refills: 0 | Status: SHIPPED | OUTPATIENT
Start: 2025-04-06 | End: 2025-04-16

## 2025-04-06 RX ORDER — BUDESONIDE AND FORMOTEROL FUMARATE DIHYDRATE 160; 4.5 UG/1; UG/1
2 AEROSOL RESPIRATORY (INHALATION) 2 TIMES DAILY
Qty: 1 EACH | Refills: 5 | COMMUNITY
Start: 2025-04-06 | End: 2025-04-06

## 2025-04-06 RX ORDER — AZITHROMYCIN 250 MG/1
TABLET, FILM COATED ORAL
Qty: 6 TABLET | Refills: 0 | Status: SHIPPED | OUTPATIENT
Start: 2025-04-06 | End: 2025-04-16

## 2025-04-06 RX ADMIN — IPRATROPIUM BROMIDE AND ALBUTEROL SULFATE 1 DOSE: 2.5; .5 SOLUTION RESPIRATORY (INHALATION) at 19:15

## 2025-04-06 NOTE — PATIENT INSTRUCTIONS
the lower right side of the abdomen, if dark-tary stools develop, if unresolving vomiting, if blood in noted in stool or vomit, or if you become concerned for dehydration follow up with the emergency room.    If you experience worsening symptoms, and increased and shortness of breath, increased work of breathing, lightheadedness, or chest pain, contact 911, or follow up immediately with the nearest Emergency Department for further evaluation      Luciano,    Thank you for trusting [facility] with your care. Your decision to come to us means a lot and we are honored to be part of your healthcare journey. We value your trust and hope your experience with us was positive and met your expectations.    We're always looking for ways to improve, and your feedback is incredibly important to us. You will receive a text or email soon asking you how your visit went. for If you could take a moment to share your thoughts, it would mean the world to us. Your input helps us better serve you and others in the community.     Thank you again for choosing us. We're grateful for the opportunity to care for you and your loved ones. We hope to see you again - though we always wish you health and wellness!    Warm regards,    [facility]    Elisabeth Mcclure, YURIY and Faisal, Clinic Supervisor

## 2025-04-06 NOTE — PROGRESS NOTES
tenderness.      Mouth/Throat:      Lips: Pink.      Mouth: Mucous membranes are moist.      Pharynx: Uvula midline. Posterior oropharyngeal erythema and postnasal drip present.   Eyes:      Pupils: Pupils are equal, round, and reactive to light.   Neck:      Thyroid: No thyromegaly.   Cardiovascular:      Rate and Rhythm: Normal rate and regular rhythm.      Pulses: Normal pulses.      Heart sounds: Normal heart sounds, S1 normal and S2 normal.   Pulmonary:      Effort: Pulmonary effort is normal. Prolonged expiration present.      Breath sounds: Wheezing present. No decreased breath sounds, rhonchi or rales.   Abdominal:      Palpations: Abdomen is soft.   Musculoskeletal:      Cervical back: Full passive range of motion without pain.      Right lower leg: No edema.      Left lower leg: No edema.   Lymphadenopathy:      Cervical: No cervical adenopathy.   Skin:     General: Skin is warm and dry.      Capillary Refill: Capillary refill takes less than 2 seconds.   Neurological:      Mental Status: He is alert and oriented to person, place, and time.   Psychiatric:         Mood and Affect: Mood normal.         Behavior: Behavior is cooperative.       PROCEDURES:  Unless otherwise noted below, none     Procedures    RESULTS:  No results found for this visit on 04/06/25.  An electronic signature was used to authenticate this note.    --Elisabeth Mcclure, APRN - CNP

## 2025-04-28 DIAGNOSIS — J10.1 INFLUENZA A: ICD-10-CM

## 2025-04-28 DIAGNOSIS — J45.41 MODERATE PERSISTENT ASTHMA WITH ACUTE EXACERBATION: ICD-10-CM

## 2025-04-28 RX ORDER — ALBUTEROL SULFATE 90 UG/1
INHALANT RESPIRATORY (INHALATION)
Qty: 18 G | Refills: 0 | OUTPATIENT
Start: 2025-04-28

## 2025-04-28 RX ORDER — LORATADINE 10 MG/1
10 TABLET ORAL DAILY
Qty: 30 TABLET | Refills: 0 | OUTPATIENT
Start: 2025-04-28

## 2025-04-28 RX ORDER — PREDNISONE 20 MG/1
TABLET ORAL
Qty: 20 TABLET | Refills: 0 | OUTPATIENT
Start: 2025-04-28

## 2025-06-08 RX ORDER — PREDNISONE 20 MG/1
TABLET ORAL
Qty: 20 TABLET | Refills: 0 | OUTPATIENT
Start: 2025-06-08

## 2025-08-09 ENCOUNTER — OFFICE VISIT (OUTPATIENT)
Age: 50
End: 2025-08-09

## 2025-08-09 VITALS
BODY MASS INDEX: 25.85 KG/M2 | HEART RATE: 74 BPM | SYSTOLIC BLOOD PRESSURE: 131 MMHG | OXYGEN SATURATION: 96 % | DIASTOLIC BLOOD PRESSURE: 90 MMHG | WEIGHT: 170 LBS

## 2025-08-09 DIAGNOSIS — H10.501 BLEPHAROCONJUNCTIVITIS OF RIGHT EYE, UNSPECIFIED BLEPHAROCONJUNCTIVITIS TYPE: Primary | ICD-10-CM

## 2025-08-09 DIAGNOSIS — R03.0 ELEVATED BLOOD PRESSURE READING: ICD-10-CM

## 2025-08-09 PROBLEM — G93.1 ANOXIC BRAIN DAMAGE (HCC): Status: ACTIVE | Noted: 2024-11-06

## 2025-08-09 PROBLEM — S06.9XAA TRAUMATIC BRAIN INJURY (HCC): Status: ACTIVE | Noted: 2024-11-06

## 2025-08-09 PROBLEM — I25.5 ISCHEMIC CARDIOMYOPATHY: Status: ACTIVE | Noted: 2017-10-03

## 2025-08-09 PROBLEM — I25.82 CHRONIC TOTAL OCCLUSION OF NATIVE CORONARY ARTERY: Status: ACTIVE | Noted: 2024-04-19

## 2025-08-09 PROBLEM — R53.81 DISABILITY AFFECTING DAILY LIVING: Status: ACTIVE | Noted: 2024-11-04

## 2025-08-09 PROBLEM — I25.10 CHRONIC TOTAL OCCLUSION OF NATIVE CORONARY ARTERY: Status: ACTIVE | Noted: 2024-04-19

## 2025-08-09 RX ORDER — POLYMYXIN B SULFATE AND TRIMETHOPRIM 1; 10000 MG/ML; [USP'U]/ML
1 SOLUTION OPHTHALMIC EVERY 6 HOURS
Qty: 2 ML | Refills: 0 | Status: SHIPPED | OUTPATIENT
Start: 2025-08-09 | End: 2025-08-19